# Patient Record
Sex: MALE | Race: WHITE | NOT HISPANIC OR LATINO | Employment: FULL TIME | URBAN - METROPOLITAN AREA
[De-identification: names, ages, dates, MRNs, and addresses within clinical notes are randomized per-mention and may not be internally consistent; named-entity substitution may affect disease eponyms.]

---

## 2018-11-23 ENCOUNTER — HOSPITAL ENCOUNTER (EMERGENCY)
Facility: HOSPITAL | Age: 38
Discharge: HOME | End: 2018-11-24
Attending: EMERGENCY MEDICINE | Admitting: EMERGENCY MEDICINE
Payer: COMMERCIAL

## 2018-11-23 VITALS
WEIGHT: 199.52 LBS | TEMPERATURE: 97.7 F | OXYGEN SATURATION: 99 % | RESPIRATION RATE: 18 BRPM | DIASTOLIC BLOOD PRESSURE: 86 MMHG | BODY MASS INDEX: 30.24 KG/M2 | HEART RATE: 88 BPM | SYSTOLIC BLOOD PRESSURE: 147 MMHG | HEIGHT: 68 IN

## 2018-11-23 DIAGNOSIS — M54.2 NECK PAIN: ICD-10-CM

## 2018-11-23 DIAGNOSIS — R42 DIZZINESS: Primary | ICD-10-CM

## 2018-11-23 PROCEDURE — 99284 EMERGENCY DEPT VISIT MOD MDM: CPT

## 2018-11-24 PROCEDURE — 93005 ELECTROCARDIOGRAM TRACING: CPT

## 2018-11-24 RX ORDER — NAPROXEN 500 MG/1
500 TABLET ORAL 2 TIMES DAILY PRN
Qty: 14 TABLET | Refills: 0 | Status: SHIPPED | OUTPATIENT
Start: 2018-11-24 | End: 2019-03-31 | Stop reason: ALTCHOICE

## 2018-11-24 RX ORDER — MECLIZINE HYDROCHLORIDE 25 MG/1
25 TABLET ORAL ONCE
Status: COMPLETED | OUTPATIENT
Start: 2018-11-24 | End: 2018-11-24

## 2018-11-24 RX ORDER — NAPROXEN 250 MG/1
500 TABLET ORAL ONCE
Status: COMPLETED | OUTPATIENT
Start: 2018-11-24 | End: 2018-11-24

## 2018-11-24 RX ORDER — MECLIZINE HYDROCHLORIDE 25 MG/1
25 TABLET ORAL EVERY 8 HOURS SCHEDULED
Qty: 30 TABLET | Refills: 0 | Status: SHIPPED | OUTPATIENT
Start: 2018-11-24 | End: 2019-03-31 | Stop reason: ALTCHOICE

## 2018-11-24 RX ORDER — OXYMETAZOLINE HYDROCHLORIDE 0.05 G/100ML
2 SPRAY NASAL ONCE
Status: COMPLETED | OUTPATIENT
Start: 2018-11-24 | End: 2018-11-24

## 2018-11-24 RX ORDER — NAPROXEN 500 MG/1
500 TABLET ORAL ONCE
Qty: 1 TABLET | Refills: 0 | Status: SHIPPED | OUTPATIENT
Start: 2018-11-24 | End: 2018-11-24

## 2018-11-24 RX ORDER — MECLIZINE HYDROCHLORIDE 25 MG/1
25 TABLET ORAL EVERY 8 HOURS SCHEDULED
Qty: 30 TABLET | Refills: 0 | Status: SHIPPED | OUTPATIENT
Start: 2018-11-24 | End: 2018-11-24

## 2018-11-24 RX ADMIN — MECLIZINE HYDROCHLORIDE 25 MG: 25 TABLET ORAL at 01:14

## 2018-11-24 RX ADMIN — NAPROXEN 500 MG: 250 TABLET ORAL at 01:14

## 2018-11-24 RX ADMIN — OXYMETAZOLINE HYDROCHLORIDE 2 SPRAY: 5 SPRAY NASAL at 01:16

## 2018-11-24 NOTE — ED PROVIDER NOTES
History  Chief Complaint   Patient presents with    Dizziness     Pt c/o dizziness for a few days with movement  Today when he got up, he got dizzy and the dizziness has worsened since then  Pt also c/o nausea and shortness of breath  Pt c/o some chest pain and headache      HPI   59-year-old male without significant medical history presents emergency department with chief complaint of dizziness  Patient states that over the past few days he has had intermittent dizziness that seemed to be exacerbated by movement, improved with rest   Tonight after standing up dizziness returned and did not completely resolve  After dizziness began, patient also began to feel slightly short of breath, but dyspnea resolved without intervention  Patient also complains of left-sided posterior neck pain that began yesterday and has since improved  On ROS, patient complains of left-sided chest pain that has been intermittent for the past year and for which he has already been evaluated by Cardiology and GI, with thought etiology pain more anxiety than a medical issue  Patient states that chest pain is not any different recently than it typically is  He also complains of chronic nasal congestion, right ear fullness yesterday that has since improved, and mild nausea earlier today, which has resolved  He denies recent fevers, chills, vomiting, abdominal pain, or complaints other than stated above  None       History reviewed  No pertinent past medical history  Past Surgical History:   Procedure Laterality Date    CHOLECYSTECTOMY      WRIST SURGERY         History reviewed  No pertinent family history  I have reviewed and agree with the history as documented  Social History   Substance Use Topics    Smoking status: Current Every Day Smoker     Types: E-Cigarettes    Smokeless tobacco: Never Used    Alcohol use Yes      Comment: rarely        Review of Systems   Constitutional: Negative for chills and fever     HENT: Positive for congestion and ear pain  Respiratory: Positive for shortness of breath  Cardiovascular: Negative for chest pain  Gastrointestinal: Negative for abdominal pain, nausea and vomiting  Musculoskeletal: Negative for back pain  Skin: Negative for rash and wound  Allergic/Immunologic: Negative for immunocompromised state  Neurological: Positive for dizziness  Negative for headaches  Psychiatric/Behavioral: The patient is not nervous/anxious  All other systems reviewed and are negative  Physical Exam  Physical Exam   Constitutional: He is oriented to person, place, and time  He appears well-nourished  No distress  HENT:   Head: Normocephalic and atraumatic  Eyes: EOM are normal    Neck: Normal range of motion  Neck supple  Cardiovascular: Normal rate and regular rhythm  Pulmonary/Chest: Effort normal and breath sounds normal  No respiratory distress  Abdominal: Soft  He exhibits no distension  There is no tenderness  Musculoskeletal: Normal range of motion  Neurological: He is alert and oriented to person, place, and time  No cranial nerve deficit  Coordination normal    Skin: Skin is warm and dry  He is not diaphoretic  Psychiatric: He has a normal mood and affect  His behavior is normal    Nursing note and vitals reviewed        Vital Signs  ED Triage Vitals [11/23/18 2357]   Temperature Pulse Respirations Blood Pressure SpO2   97 7 °F (36 5 °C) 88 18 147/86 99 %      Temp Source Heart Rate Source Patient Position - Orthostatic VS BP Location FiO2 (%)   Oral Monitor Lying Right arm --      Pain Score       No Pain           Vitals:    11/23/18 2357   BP: 147/86   Pulse: 88   Patient Position - Orthostatic VS: Lying       Visual Acuity      ED Medications  Medications   oxymetazoline (AFRIN) 0 05 % nasal spray 2 spray (2 sprays Each Nare Given 11/24/18 0116)   meclizine (ANTIVERT) tablet 25 mg (25 mg Oral Given 11/24/18 0114)   naproxen (NAPROSYN) tablet 500 mg (500 mg Oral Given 11/24/18 0114)       Diagnostic Studies  Results Reviewed     None                 No orders to display              Procedures  Procedures   EKG: NSR @ 79 BPM, no significant ST/T wave abnormality, normal EKG  No prior for comparison  Phone Contacts  ED Phone Contact    ED Course                               MDM  Number of Diagnoses or Management Options  Dizziness:   Neck pain:   Diagnosis management comments: 45-year-old male with intermittent dizziness x 3 days  Normal neuro exam, also c/o recent ear fullness and nasal congestion  Likely peripheral   Will treat sx and reassess  Patient offered obs in ED vs discharge with return precautions; would like to go home and sleep  Understands return precautions  CritCare Time    Disposition  Final diagnoses:   Dizziness   Neck pain     Time reflects when diagnosis was documented in both MDM as applicable and the Disposition within this note     Time User Action Codes Description Comment    11/24/2018  1:03 AM Nadya Patiño Add [R42] Dizziness     11/24/2018  1:06 AM Dayneroxanne  Add [M54 2] Neck pain       ED Disposition     ED Disposition Condition Comment    Discharge  Earma Factor discharge to home/self care  Condition at discharge: Good        Follow-up Information     Follow up With Specialties Details Why [address], DO Family Medicine  As needed, If symptoms worsen [address] [place] [address]  [phone]            Discharge Medication List as of 11/24/2018  1:07 AM      START taking these medications    Details   meclizine (ANTIVERT) 25 mg tablet Take 1 tablet (25 mg total) by mouth every 8 (eight) hours, Starting Sat 11/24/2018, Print      naproxen (NAPROSYN) 500 mg tablet Take 1 tablet (500 mg total) by mouth once for 1 dose, Starting Sat 11/24/2018, Print           No discharge procedures on file      ED Provider  Electronically Signed by           Shania Hays MD  11/29/18 1240

## 2018-11-24 NOTE — DISCHARGE INSTRUCTIONS
Dizziness   WHAT YOU NEED TO KNOW:   Dizziness is a feeling of being off balance or unsteady  Common causes of dizziness are an inner ear fluid imbalance or a lack of oxygen in your blood  Dizziness may be acute (lasts 3 days or less) or chronic (lasts longer than 3 days)  You may have dizzy spells that last from seconds to a few hours  DISCHARGE INSTRUCTIONS:   Return to the emergency department if:   · You have a headache and a stiff neck  · You have shaking chills and a fever  · You vomit over and over with no relief  · Your vomit or bowel movements are red or black  · You have pain in your chest, back, or abdomen  · You have numbness, especially in your face, arms, or legs  · You have trouble moving your arms or legs  · You are confused  Contact your healthcare provider if:   · You have a fever  · Your symptoms do not get better with treatment  · You have questions or concerns about your condition or care  Manage your symptoms:   · Do not drive  or operate heavy machinery when you are dizzy  · Get up slowly  from sitting or lying down  · Drink plenty of liquids  Liquids help prevent dehydration  Ask how much liquid to drink each day and which liquids are best for you  Follow up with your healthcare provider as directed:  Write down your questions so you remember to ask them during your visits  © 2017 [address]  Information is for End User's use only and may not be sold, redistributed or otherwise used for commercial purposes  All illustrations and images included in CareNotes® are the copyrighted property of A D A M , Inc  or Pedro Sawyer  The above information is an  only  It is not intended as medical advice for individual conditions or treatments  Talk to your doctor, nurse or pharmacist before following any medical regimen to see if it is safe and effective for you

## 2018-11-26 LAB
ATRIAL RATE: 79 BPM
P AXIS: 49 DEGREES
PR INTERVAL: 136 MS
QRS AXIS: 70 DEGREES
QRSD INTERVAL: 84 MS
QT INTERVAL: 354 MS
QTC INTERVAL: 405 MS
T WAVE AXIS: 43 DEGREES
VENTRICULAR RATE: 79 BPM

## 2018-11-26 PROCEDURE — 93010 ELECTROCARDIOGRAM REPORT: CPT | Performed by: INTERNAL MEDICINE

## 2019-03-29 ENCOUNTER — HOSPITAL ENCOUNTER (EMERGENCY)
Facility: HOSPITAL | Age: 39
Discharge: HOME | End: 2019-03-30
Attending: EMERGENCY MEDICINE
Payer: COMMERCIAL

## 2019-03-29 ENCOUNTER — APPOINTMENT (EMERGENCY)
Dept: CT IMAGING | Facility: HOSPITAL | Age: 39
End: 2019-03-29
Payer: COMMERCIAL

## 2019-03-29 VITALS
HEIGHT: 68 IN | RESPIRATION RATE: 16 BRPM | WEIGHT: 194 LBS | SYSTOLIC BLOOD PRESSURE: 110 MMHG | DIASTOLIC BLOOD PRESSURE: 62 MMHG | BODY MASS INDEX: 29.4 KG/M2 | TEMPERATURE: 98 F | OXYGEN SATURATION: 95 % | HEART RATE: 67 BPM

## 2019-03-29 DIAGNOSIS — R51.9 HEADACHE: ICD-10-CM

## 2019-03-29 DIAGNOSIS — R07.89 CHEST WALL PAIN: ICD-10-CM

## 2019-03-29 DIAGNOSIS — M54.9 BACK PAIN: Primary | ICD-10-CM
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
## 2019-03-29 LAB
ANION GAP SERPL CALCULATED.3IONS-SCNC: 9 MMOL/L (ref 4–13)
BASOPHILS # BLD AUTO: 0.04 THOUSANDS/ΜL (ref 0–0.1)
BASOPHILS NFR BLD AUTO: 1 % (ref 0–1)
BILIRUB UR QL STRIP: NEGATIVE
BUN SERPL-MCNC: 25 MG/DL (ref 5–25)
CALCIUM SERPL-MCNC: 8.9 MG/DL (ref 8.3–10.1)
CHLORIDE SERPL-SCNC: 106 MMOL/L (ref 100–108)
CLARITY UR: CLEAR
CO2 SERPL-SCNC: 27 MMOL/L (ref 21–32)
COLOR UR: YELLOW
CREAT SERPL-MCNC: 1.27 MG/DL (ref 0.6–1.3)
EOSINOPHIL # BLD AUTO: 0.15 THOUSAND/ΜL (ref 0–0.61)
EOSINOPHIL NFR BLD AUTO: 2 % (ref 0–6)
ERYTHROCYTE [DISTWIDTH] IN BLOOD BY AUTOMATED COUNT: 13.1 % (ref 11.6–15.1)
GFR SERPL CREATININE-BSD FRML MDRD: 71 ML/MIN/1.73SQ M
GLUCOSE SERPL-MCNC: 96 MG/DL (ref 65–140)
GLUCOSE UR STRIP-MCNC: NEGATIVE MG/DL
HCT VFR BLD AUTO: 41.2 % (ref 36.5–49.3)
HGB BLD-MCNC: 14.1 G/DL (ref 12–17)
HGB UR QL STRIP.AUTO: NEGATIVE
IMM GRANULOCYTES # BLD AUTO: 0.01 THOUSAND/UL (ref 0–0.2)
IMM GRANULOCYTES NFR BLD AUTO: 0 % (ref 0–2)
KETONES UR STRIP-MCNC: NEGATIVE MG/DL
LEUKOCYTE ESTERASE UR QL STRIP: NEGATIVE
LYMPHOCYTES # BLD AUTO: 2.88 THOUSANDS/ΜL (ref 0.6–4.47)
LYMPHOCYTES NFR BLD AUTO: 41 % (ref 14–44)
MCH RBC QN AUTO: 29.4 PG (ref 26.8–34.3)
MCHC RBC AUTO-ENTMCNC: 34.2 G/DL (ref 31.4–37.4)
MCV RBC AUTO: 86 FL (ref 82–98)
MONOCYTES # BLD AUTO: 0.51 THOUSAND/ΜL (ref 0.17–1.22)
MONOCYTES NFR BLD AUTO: 7 % (ref 4–12)
NEUTROPHILS # BLD AUTO: 3.37 THOUSANDS/ΜL (ref 1.85–7.62)
NEUTS SEG NFR BLD AUTO: 49 % (ref 43–75)
NITRITE UR QL STRIP: NEGATIVE
NRBC BLD AUTO-RTO: 0 /100 WBCS
PH UR STRIP.AUTO: 6 [PH]
PLATELET # BLD AUTO: 163 THOUSANDS/UL (ref 149–390)
PMV BLD AUTO: 9.5 FL (ref 8.9–12.7)
POTASSIUM SERPL-SCNC: 3.6 MMOL/L (ref 3.5–5.3)
PROT UR STRIP-MCNC: NEGATIVE MG/DL
RBC # BLD AUTO: 4.79 MILLION/UL (ref 3.88–5.62)
SODIUM SERPL-SCNC: 142 MMOL/L (ref 136–145)
SP GR UR STRIP.AUTO: <=1.005 (ref 1–1.03)
UROBILINOGEN UR QL STRIP.AUTO: 0.2 E.U./DL
WBC # BLD AUTO: 6.96 THOUSAND/UL (ref 4.31–10.16)

## 2019-03-29 PROCEDURE — 99284 EMERGENCY DEPT VISIT MOD MDM: CPT

## 2019-03-29 PROCEDURE — 72125 CT NECK SPINE W/O DYE: CPT

## 2019-03-29 PROCEDURE — 96361 HYDRATE IV INFUSION ADD-ON: CPT

## 2019-03-29 PROCEDURE — 71260 CT THORAX DX C+: CPT

## 2019-03-29 PROCEDURE — 70450 CT HEAD/BRAIN W/O DYE: CPT

## 2019-03-29 PROCEDURE — 36415 COLL VENOUS BLD VENIPUNCTURE: CPT | Performed by: EMERGENCY MEDICINE

## 2019-03-29 PROCEDURE — 81003 URINALYSIS AUTO W/O SCOPE: CPT

## 2019-03-29 PROCEDURE — 74177 CT ABD & PELVIS W/CONTRAST: CPT

## 2019-03-29 PROCEDURE — 80048 BASIC METABOLIC PNL TOTAL CA: CPT | Performed by: EMERGENCY MEDICINE

## 2019-03-29 PROCEDURE — 85025 COMPLETE CBC W/AUTO DIFF WBC: CPT | Performed by: EMERGENCY MEDICINE

## 2019-03-29 RX ORDER — MORPHINE SULFATE 4 MG/ML
4 INJECTION, SOLUTION INTRAMUSCULAR; INTRAVENOUS ONCE
Status: DISCONTINUED | OUTPATIENT
Start: 2019-03-29 | End: 2019-03-30 | Stop reason: HOSPADM

## 2019-03-29 RX ADMIN — SODIUM CHLORIDE 1000 ML: 0.9 INJECTION, SOLUTION INTRAVENOUS at 21:51

## 2019-03-29 RX ADMIN — IOHEXOL 100 ML: 350 INJECTION, SOLUTION INTRAVENOUS at 23:17

## 2019-03-30 PROCEDURE — 96374 THER/PROPH/DIAG INJ IV PUSH: CPT

## 2019-03-30 RX ORDER — NAPROXEN 500 MG/1
500 TABLET ORAL 2 TIMES DAILY WITH MEALS
Qty: 30 TABLET | Refills: 0 | Status: SHIPPED | OUTPATIENT
Start: 2019-03-30 | End: 2019-03-31 | Stop reason: ALTCHOICE

## 2019-03-30 RX ORDER — KETOROLAC TROMETHAMINE 30 MG/ML
15 INJECTION, SOLUTION INTRAMUSCULAR; INTRAVENOUS ONCE
Status: COMPLETED | OUTPATIENT
Start: 2019-03-30 | End: 2019-03-30

## 2019-03-30 RX ORDER — DIAZEPAM 5 MG/1
5 TABLET ORAL EVERY 8 HOURS PRN
Qty: 10 TABLET | Refills: 0 | Status: SHIPPED | OUTPATIENT
Start: 2019-03-30 | End: 2019-03-31 | Stop reason: ALTCHOICE

## 2019-03-30 RX ADMIN — KETOROLAC TROMETHAMINE 15 MG: 30 INJECTION, SOLUTION INTRAMUSCULAR; INTRAVENOUS at 00:14

## 2019-03-30 NOTE — DISCHARGE INSTRUCTIONS
Use Tylenol and naproxen for pain control during the day  Use Valium for pain control at night  Use the incentive spirometer to ensure full lung expansion  Use the incentive spirometer 10 times an hour every hour while awake

## 2019-03-30 NOTE — ED PROVIDER NOTES
History  Chief Complaint   Patient presents with    Back Pain     Pt reports he was snow boarding 1+ week ago and fell on his back  Pt reports "I felt a pop in my back and neck " PT back pain, neck pain, rib pain  43-year-old male presents after a snowboarding accident that occurred 1 week ago  He states that a skier cut in front of him which caused him to crash  Patient was not wearing a helmet  He states that he hit his back and neck on the ground  Did not lose consciousness  Does not believe he hit his head  Since then he has been having worsening headache, neck pain, back pain, chest tightness, abdominal pain  Attempted Tylenol which assisted the headache but did not assist any of the other pain  No blood thinners  Mild worsening since  Patient does not endorse loss of bladder control, no minerva rectal numbness, no numbness in his extremities, no difficulty walking, no off-balance feeling  Prior to Admission Medications   Prescriptions Last Dose Informant Patient Reported? Taking?   meclizine (ANTIVERT) 25 mg tablet   No No   Sig: Take 1 tablet (25 mg total) by mouth every 8 (eight) hours   naproxen (NAPROSYN) 500 mg tablet   No No   Sig: Take 1 tablet (500 mg total) by mouth 2 (two) times a day as needed for mild pain      Facility-Administered Medications: None       History reviewed  No pertinent past medical history  Past Surgical History:   Procedure Laterality Date    CHOLECYSTECTOMY      WRIST SURGERY         History reviewed  No pertinent family history  I have reviewed and agree with the history as documented  Social History     Tobacco Use    Smoking status: Current Every Day Smoker     Types: E-Cigarettes    Smokeless tobacco: Never Used   Substance Use Topics    Alcohol use: Yes     Comment: rarely    Drug use: No        Review of Systems   Constitutional: Negative for chills, fatigue and fever  HENT: Negative for congestion, rhinorrhea and sore throat      Eyes: Negative for visual disturbance  Respiratory: Positive for chest tightness and shortness of breath  Negative for cough  Cardiovascular: Negative for chest pain and leg swelling  Gastrointestinal: Positive for abdominal pain and nausea  Negative for vomiting  Genitourinary: Negative for dysuria and flank pain  Musculoskeletal: Positive for back pain and neck pain  Skin: Negative for rash and wound  Neurological: Positive for headaches  Negative for dizziness, weakness, light-headedness and numbness  Psychiatric/Behavioral: Negative for confusion  Physical Exam  Physical Exam   Constitutional: He is oriented to person, place, and time  He appears well-developed and well-nourished  No distress  HENT:   Head: Normocephalic and atraumatic  Right Ear: External ear normal    Left Ear: External ear normal    Mouth/Throat: Oropharynx is clear and moist    No hematotympanum   Eyes: Pupils are equal, round, and reactive to light  Conjunctivae and EOM are normal  Right eye exhibits no discharge  Left eye exhibits no discharge  No scleral icterus  Neck: Normal range of motion  Neck supple  No JVD present  No tracheal deviation present  Midline cervical tenderness, especially to C7   Cardiovascular: Normal rate, regular rhythm, normal heart sounds and intact distal pulses  Exam reveals no gallop and no friction rub  No murmur heard  Pulmonary/Chest: Effort normal and breath sounds normal  No stridor  No respiratory distress  He has no wheezes  He has no rales  He exhibits tenderness  CTA-BL   Abdominal: Soft  Bowel sounds are normal  He exhibits no distension  There is tenderness (Upper abdomen)  There is no rebound and no guarding  Stable pelvis   Musculoskeletal: Normal range of motion  He exhibits tenderness  He exhibits no edema or deformity  Back is tender to C7 midline, lumbar spine, no step offs  Neurological: He is alert and oriented to person, place, and time   No cranial nerve deficit or sensory deficit  GCS = 15  Neurovascularly intact distal to the injury  Skin: Skin is warm and dry  No rash noted  He is not diaphoretic  No erythema  No pallor  No wounds, abrasions, no ecchymosis  Psychiatric: He has a normal mood and affect  His behavior is normal    Vitals reviewed        Vital Signs  ED Triage Vitals [03/29/19 2017]   Temperature Pulse Respirations Blood Pressure SpO2   98 °F (36 7 °C) 69 18 125/75 97 %      Temp Source Heart Rate Source Patient Position - Orthostatic VS BP Location FiO2 (%)   Oral Monitor Sitting Left arm --      Pain Score       8           Vitals:    03/29/19 2017 03/29/19 2153   BP: 125/75 110/62   Pulse: 69 67   Patient Position - Orthostatic VS: Sitting Lying         Visual Acuity      ED Medications  Medications   morphine (PF) 4 mg/mL injection 4 mg (4 mg Intravenous Not Given 3/29/19 2153)   sodium chloride 0 9 % bolus 1,000 mL (0 mL Intravenous Stopped 3/29/19 2329)   iohexol (OMNIPAQUE) 350 MG/ML injection (MULTI-DOSE) 100 mL (100 mL Intravenous Given 3/29/19 2317)   ketorolac (TORADOL) injection 15 mg (15 mg Intravenous Given 3/30/19 0014)       Diagnostic Studies  Results Reviewed     Procedure Component Value Units Date/Time    UA w Reflex to Microscopic w Reflex to Culture [[id number]] Collected:  03/29/19 2330    Lab Status:  Final result Specimen:  Urine, Clean Catch Updated:  03/29/19 2335     Color, UA Yellow     Clarity, UA Clear     Specific Gravity, UA <=1 005     pH, UA 6 0     Leukocytes, UA Negative     Nitrite, UA Negative     Protein, UA Negative mg/dl      Glucose, UA Negative mg/dl      Ketones, UA Negative mg/dl      Urobilinogen, UA 0 2 E U /dl      Bilirubin, UA Negative     Blood, UA Negative    Basic metabolic panel [[id number]] Collected:  03/29/19 2150    Lab Status:  Final result Specimen:  Blood from Arm, Right Updated:  03/29/19 2209     Sodium 142 mmol/L      Potassium 3 6 mmol/L      Chloride 106 mmol/L      CO2 27 mmol/L ANION GAP 9 mmol/L      BUN 25 mg/dL      Creatinine 1 27 mg/dL      Glucose 96 mg/dL      Calcium 8 9 mg/dL      eGFR 71 ml/min/1 73sq m     Narrative:       National Kidney Disease Education Program recommendations are as follows:  GFR calculation is accurate only with a steady state creatinine  Chronic Kidney disease less than 60 ml/min/1 73 sq  meters  Kidney failure less than 15 ml/min/1 73 sq  meters  CBC and differential [[id number]] Collected:  03/29/19 2150    Lab Status:  Final result Specimen:  Blood from Arm, Right Updated:  03/29/19 2204     WBC 6 96 Thousand/uL      RBC 4 79 Million/uL      Hemoglobin 14 1 g/dL      Hematocrit 41 2 %      MCV 86 fL      MCH 29 4 pg      MCHC 34 2 g/dL      RDW 13 1 %      MPV 9 5 fL      Platelets 120 Thousands/uL      nRBC 0 /100 WBCs      Neutrophils Relative 49 %      Immat GRANS % 0 %      Lymphocytes Relative 41 %      Monocytes Relative 7 %      Eosinophils Relative 2 %      Basophils Relative 1 %      Neutrophils Absolute 3 37 Thousands/µL      Immature Grans Absolute 0 01 Thousand/uL      Lymphocytes Absolute 2 88 Thousands/µL      Monocytes Absolute 0 51 Thousand/µL      Eosinophils Absolute 0 15 Thousand/µL      Basophils Absolute 0 04 Thousands/µL                  CT head without contrast   ED Interpretation by Elena Garsia DO (03/29 2358)   No acute intracranial abnormality  Final Result by Magda Joy DO (03/29 2323)      No acute intracranial abnormality  Workstation performed: [id number]         CT spine cervical without contrast   ED Interpretation by Elena Garsia DO (03/29 2358)   No cervical spine fracture or traumatic malalignment  Final Result by Demetri Velazco MD (03/29 2331)      No cervical spine fracture or traumatic malalignment                     Workstation performed: [id number]         CT recon only thoracolumbar   ED Interpretation by Elena Garsia DO (03/29 2357)   No fracture or traumatic subluxation  Final Result by Magda Joy DO (03/29 2351)      No fracture or traumatic subluxation  Workstation performed: [id number]         CT chest abdomen pelvis w contrast   ED Interpretation by Elena Garsia DO (03/29 2358)   [address] right-sided hydroureteronephrosis without evidence of obstructing stone  Findings may be related to markedly distended urinary bladder  Clinical correlation is recommended  Follow-up is recommended  Final Result by Magda Joy DO (03/29 2345)      Mild right-sided hydroureteronephrosis without evidence of obstructing stone  Findings may be related to markedly distended urinary bladder  Clinical correlation is recommended  Follow-up is recommended  Workstation performed: [id number]                    Procedures  Procedures       Phone Contacts  ED Phone Contact    ED Course                               MDM  Number of Diagnoses or Management Options  Back pain:   Chest wall pain:   Headache:   Snowboarding accident:   Diagnosis management comments: 71-year-old status post snowboarding incident  Presenting with pain to head, neck, entire spine, chest, abdomen pelvis  Patient attempted Tylenol which improved his headache however did not help his other symptoms  CT scans are negative  Likely musculoskeletal pain after the accident  Patient is neurovascularly intact  He is discharged home with symptomatic treatment, good return precautions in case of worsening condition or any signs of neurovascular compromise  Jael Malik        Amount and/or Complexity of Data Reviewed  Clinical lab tests: reviewed and ordered  Tests in the radiology section of CPT®: ordered and reviewed        Disposition  Final diagnoses:   Back pain   Headache   Snowboarding accident   Chest wall pain     Time reflects when diagnosis was documented in both MDM as applicable and the Disposition within this note     Time User Action Codes Description Comment 3/29/2019 10:09 PM Rob Alysha BOOTH Add [M54 9] Back pain     3/29/2019 10:09 PM Alysha Rivas Add [R51] Headache     3/29/2019 10:09 PM Savita Rivas Add [J11 360I] Snowboarding accident     3/30/2019 12:09 AM Savita Rivas Add [R07 89] Chest wall pain       ED Disposition     ED Disposition Condition Date/Time Comment    Discharge Stable Sat Mar 30, 2019 12:09 AM Pat Alter discharge to home/self care  Follow-up Information    None         Patient's Medications   Discharge Prescriptions    DIAZEPAM (VALIUM) 5 MG TABLET    Take 1 tablet (5 mg total) by mouth every 8 (eight) hours as needed for sleep or muscle spasms for up to 5 days       Start Date: 3/30/2019 End Date: 4/4/2019       Order Dose: 5 mg       Quantity: 10 tablet    Refills: 0    NAPROXEN (NAPROSYN) 500 MG TABLET    Take 1 tablet (500 mg total) by mouth 2 (two) times a day with meals As needed for pain control       Start Date: 3/30/2019 End Date: --       Order Dose: 500 mg       Quantity: 30 tablet    Refills: 0     No discharge procedures on file      ED Provider  Electronically Signed by           Noy Sarah DO  03/30/19 7146

## 2019-03-31 ENCOUNTER — HOSPITAL ENCOUNTER (EMERGENCY)
Facility: HOSPITAL | Age: 39
Discharge: HOME | End: 2019-03-31
Attending: EMERGENCY MEDICINE | Admitting: EMERGENCY MEDICINE
Payer: OTHER MISCELLANEOUS

## 2019-03-31 ENCOUNTER — APPOINTMENT (EMERGENCY)
Dept: RADIOLOGY | Facility: HOSPITAL | Age: 39
End: 2019-03-31
Payer: OTHER MISCELLANEOUS

## 2019-03-31 VITALS
HEIGHT: 68 IN | DIASTOLIC BLOOD PRESSURE: 60 MMHG | WEIGHT: 195 LBS | BODY MASS INDEX: 29.55 KG/M2 | RESPIRATION RATE: 18 BRPM | TEMPERATURE: 98.3 F | HEART RATE: 69 BPM | SYSTOLIC BLOOD PRESSURE: 123 MMHG | OXYGEN SATURATION: 96 %

## 2019-03-31 DIAGNOSIS — S52.009A CLOSED FRACTURE OF PROXIMAL END OF ULNA WITHOUT ADDITIONAL FRACTURE: Primary | ICD-10-CM

## 2019-03-31 PROCEDURE — 73080 X-RAY EXAM OF ELBOW: CPT

## 2019-03-31 PROCEDURE — 73090 X-RAY EXAM OF FOREARM: CPT

## 2019-03-31 PROCEDURE — 99283 EMERGENCY DEPT VISIT LOW MDM: CPT

## 2019-04-01 ENCOUNTER — APPOINTMENT (OUTPATIENT)
Dept: OCCUPATIONAL MEDICINE | Facility: CLINIC | Age: 39
End: 2019-04-01
Payer: OTHER MISCELLANEOUS

## 2019-04-01 ENCOUNTER — TRANSCRIBE ORDERS (OUTPATIENT)
Dept: OCCUPATIONAL MEDICINE | Facility: CLINIC | Age: 39
End: 2019-04-01

## 2019-04-01 ENCOUNTER — APPOINTMENT (OUTPATIENT)
Dept: RADIOLOGY | Facility: CLINIC | Age: 39
End: 2019-04-01
Payer: OTHER MISCELLANEOUS

## 2019-04-01 DIAGNOSIS — R52 PAIN: Primary | ICD-10-CM

## 2019-04-01 DIAGNOSIS — R52 PAIN: ICD-10-CM

## 2019-04-01 PROCEDURE — 73110 X-RAY EXAM OF WRIST: CPT

## 2019-04-01 PROCEDURE — 99213 OFFICE O/P EST LOW 20 MIN: CPT

## 2019-04-03 ENCOUNTER — APPOINTMENT (OUTPATIENT)
Dept: OCCUPATIONAL MEDICINE | Facility: CLINIC | Age: 39
End: 2019-04-03
Payer: OTHER MISCELLANEOUS

## 2019-04-03 PROCEDURE — 99213 OFFICE O/P EST LOW 20 MIN: CPT

## 2020-12-23 ENCOUNTER — APPOINTMENT (OUTPATIENT)
Dept: RADIOLOGY | Facility: CLINIC | Age: 40
End: 2020-12-23
Payer: OTHER MISCELLANEOUS

## 2020-12-23 ENCOUNTER — OCCMED (OUTPATIENT)
Dept: URGENT CARE | Facility: CLINIC | Age: 40
End: 2020-12-23
Payer: OTHER MISCELLANEOUS

## 2020-12-23 DIAGNOSIS — S00.83XA CONTUSION OF FOREHEAD, INITIAL ENCOUNTER: ICD-10-CM

## 2020-12-23 DIAGNOSIS — S00.83XA CONTUSION OF FOREHEAD, INITIAL ENCOUNTER: Primary | ICD-10-CM

## 2020-12-23 PROCEDURE — 99284 EMERGENCY DEPT VISIT MOD MDM: CPT | Performed by: FAMILY MEDICINE

## 2020-12-23 PROCEDURE — 70250 X-RAY EXAM OF SKULL: CPT

## 2020-12-23 PROCEDURE — G0383 LEV 4 HOSP TYPE B ED VISIT: HCPCS | Performed by: FAMILY MEDICINE

## 2020-12-26 ENCOUNTER — APPOINTMENT (OUTPATIENT)
Dept: URGENT CARE | Facility: CLINIC | Age: 40
End: 2020-12-26
Payer: OTHER MISCELLANEOUS

## 2020-12-26 PROCEDURE — 99214 OFFICE O/P EST MOD 30 MIN: CPT

## 2021-01-08 ENCOUNTER — APPOINTMENT (OUTPATIENT)
Dept: URGENT CARE | Facility: CLINIC | Age: 41
End: 2021-01-08
Payer: OTHER MISCELLANEOUS

## 2021-01-08 PROCEDURE — 99213 OFFICE O/P EST LOW 20 MIN: CPT

## 2021-04-05 ENCOUNTER — OFFICE VISIT (OUTPATIENT)
Dept: URGENT CARE | Facility: CLINIC | Age: 41
End: 2021-04-05

## 2021-04-05 VITALS — WEIGHT: 200 LBS | HEIGHT: 68 IN | BODY MASS INDEX: 30.31 KG/M2

## 2021-04-05 DIAGNOSIS — K52.9 ACUTE GASTROENTERITIS: Primary | ICD-10-CM

## 2021-04-05 PROCEDURE — G0382 LEV 3 HOSP TYPE B ED VISIT: HCPCS | Performed by: NURSE PRACTITIONER

## 2021-04-05 NOTE — PATIENT INSTRUCTIONS
This seems to be in acute gastroenteritis  Clear liquid diet today  This may include Jell-O, broth, Gatorade and water  Advance to bland foods tomorrow  Avoid dairy, caffeine, spicy and citrus seafood as may make stomach irritation worse  Did offer COVID-19 testing  Do not suspect COVID-19 but recommend you continue to monitor for symptoms of the COVID as discussed if he develops any symptoms any should be tested as you declined testing here today  Go to the ER with any worsening symptoms, increased abdominal pain, persistent vomiting, fevers, no improvement in symptoms  You can return to work on 04/07/2021 as long as symptoms are improving  Gastroenteritis   WHAT YOU NEED TO KNOW:   Gastroenteritis, or stomach flu, is an infection of the stomach and intestines  DISCHARGE INSTRUCTIONS:   Call 911 for any of the following:   · You have trouble breathing or a very fast pulse  Return to the emergency department if:   · You see blood in your diarrhea  · You cannot stop vomiting  · You have not urinated for 12 hours  · You feel like you are going to faint  Contact your healthcare provider if:   · You have a fever  · You continue to vomit or have diarrhea, even after treatment  · You see worms in your diarrhea  · Your mouth or eyes are dry  You are not urinating as much or as often  · You have questions or concerns about your condition or care  Medicines:   · Medicines  may be given to stop vomiting or diarrhea, decrease abdominal cramps, or treat an infection  · Take your medicine as directed  Contact your healthcare provider if you think your medicine is not helping or if you have side effects  Tell him or her if you are allergic to any medicine  Keep a list of the medicines, vitamins, and herbs you take  Include the amounts, and when and why you take them  Bring the list or the pill bottles to follow-up visits   Carry your medicine list with you in case of an emergency  Manage your symptoms:   · Drink liquids as directed  Ask your healthcare provider how much liquid to drink each day, and which liquids are best for you  You may also need to drink an oral rehydration solution (ORS)  An ORS has the right amounts of sugar, salt, and minerals in water to replace body fluids  · Eat bland foods  When you feel hungry, begin eating soft, bland foods  Examples are bananas, clear soup, potatoes, and applesauce  Do not have dairy products, alcohol, sugary drinks, or drinks with caffeine until you feel better  · Rest as much as possible  Slowly start to do more each day when you begin to feel better  Prevent the spread of gastroenteritis:  Gastroenteritis can spread easily  Keep yourself, your family, and your surroundings clean to help prevent the spread of gastroenteritis:  · Wash your hands often  Use soap and water  Wash your hands after you use the bathroom, change a child's diapers, or sneeze  Wash your hands before you prepare or eat food  · Clean surfaces and do laundry often  Wash your clothes and towels separately from the rest of the laundry  Clean surfaces in your home with antibacterial  or bleach  · Clean food thoroughly and cook safely  Wash raw vegetables before you cook  Cook meat, fish, and eggs fully  Do not use the same dishes for raw meat as you do for other foods  Refrigerate any leftover food immediately  · Be aware when you camp or travel  Drink only clean water  Do not drink from rivers or lakes unless you purify or boil the water first  When you travel, drink bottled water and do not add ice  Do not eat fruit that has not been peeled  Do not eat raw fish or meat that is not fully cooked  Follow up with your healthcare provider as directed:  Write down your questions so you remember to ask them during your visits     © Copyright [address] Information is for End User's use only and may not be sold, redistributed or otherwise used for commercial purposes  All illustrations and images included in CareNotes® are the copyrighted property of A D A M , Inc  or Ministerio Hernandez  The above information is an  only  It is not intended as medical advice for individual conditions or treatments  Talk to your doctor, nurse or pharmacist before following any medical regimen to see if it is safe and effective for you

## 2021-04-05 NOTE — PROGRESS NOTES
[facility]        NAME: Omega Webster is a 36 y o  male  : 1980    MRN: [id number]  DATE: 2021  TIME: 2:36 PM    Assessment and Plan   Acute gastroenteritis [K52 9]  1  Acute gastroenteritis           Patient Instructions     Patient Instructions       This seems to be in acute gastroenteritis  Clear liquid diet today  This may include Jell-O, broth, Gatorade and water  Advance to bland foods tomorrow  Avoid dairy, caffeine, spicy and citrus seafood as may make stomach irritation worse  Did offer COVID-19 testing  Do not suspect COVID-19 but recommend you continue to monitor for symptoms of the COVID as discussed if he develops any symptoms any should be tested as you declined testing here today  Go to the ER with any worsening symptoms, increased abdominal pain, persistent vomiting, fevers, no improvement in symptoms  You can return to work on 2021 as long as symptoms are improving  Gastroenteritis   WHAT YOU NEED TO KNOW:   Gastroenteritis, or stomach flu, is an infection of the stomach and intestines  DISCHARGE INSTRUCTIONS:   Call 911 for any of the following:   · You have trouble breathing or a very fast pulse  Return to the emergency department if:   · You see blood in your diarrhea  · You cannot stop vomiting  · You have not urinated for 12 hours  · You feel like you are going to faint  Contact your healthcare provider if:   · You have a fever  · You continue to vomit or have diarrhea, even after treatment  · You see worms in your diarrhea  · Your mouth or eyes are dry  You are not urinating as much or as often  · You have questions or concerns about your condition or care  Medicines:   · Medicines  may be given to stop vomiting or diarrhea, decrease abdominal cramps, or treat an infection  · Take your medicine as directed    Contact your healthcare provider if you think your medicine is not helping or if you have side effects  Tell him or her if you are allergic to any medicine  Keep a list of the medicines, vitamins, and herbs you take  Include the amounts, and when and why you take them  Bring the list or the pill bottles to follow-up visits  Carry your medicine list with you in case of an emergency  Manage your symptoms:   · Drink liquids as directed  Ask your healthcare provider how much liquid to drink each day, and which liquids are best for you  You may also need to drink an oral rehydration solution (ORS)  An ORS has the right amounts of sugar, salt, and minerals in water to replace body fluids  · Eat bland foods  When you feel hungry, begin eating soft, bland foods  Examples are bananas, clear soup, potatoes, and applesauce  Do not have dairy products, alcohol, sugary drinks, or drinks with caffeine until you feel better  · Rest as much as possible  Slowly start to do more each day when you begin to feel better  Prevent the spread of gastroenteritis:  Gastroenteritis can spread easily  Keep yourself, your family, and your surroundings clean to help prevent the spread of gastroenteritis:  · Wash your hands often  Use soap and water  Wash your hands after you use the bathroom, change a child's diapers, or sneeze  Wash your hands before you prepare or eat food  · Clean surfaces and do laundry often  Wash your clothes and towels separately from the rest of the laundry  Clean surfaces in your home with antibacterial  or bleach  · Clean food thoroughly and cook safely  Wash raw vegetables before you cook  Cook meat, fish, and eggs fully  Do not use the same dishes for raw meat as you do for other foods  Refrigerate any leftover food immediately  · Be aware when you camp or travel  Drink only clean water  Do not drink from rivers or lakes unless you purify or boil the water first  When you travel, drink bottled water and do not add ice  Do not eat fruit that has not been peeled   Do not eat raw fish or meat that is not fully cooked  Follow up with your healthcare provider as directed:  Write down your questions so you remember to ask them during your visits  © Copyright [address] Information is for End User's use only and may not be sold, redistributed or otherwise used for commercial purposes  All illustrations and images included in CareNotes® are the copyrighted property of [employer]  or [facility] Cesar Renetta   The above information is an  only  It is not intended as medical advice for individual conditions or treatments  Talk to your doctor, nurse or pharmacist before following any medical regimen to see if it is safe and effective for you  Chief Complaint     Chief Complaint   Patient presents with    Diarrhea     nausea started this am just not feeling well very hard time with stools not aware of anyone with covid     Fatigue     does state been a little tired          History of Present Illness   Jaron Schultz presents to the clinic c/o     This is a 66-year-old male here today with complaints of diarrhea, nausea and upset stomach  He states symptoms started this morning  He has had multiple episode of diarrhea today  He denies any chest pain or shortness of breath  He does have some baseline chest discomfort with anxiety  He states there is no change in this  He denies any vomiting  He states he did eat steak last night that was in his Fridge possibly longer than it should have been  No fevers bodies or chills at this time  No cough or congestion  No loss of taste or smell  He states he did have COVID in the beginning of December 2020  Review of Systems   Review of Systems   Constitutional: Negative for activity change, chills, fatigue and fever  Respiratory: Negative  Cardiovascular: Negative  Gastrointestinal: Positive for diarrhea and nausea  Negative for abdominal pain and vomiting  Skin: Negative  Psychiatric/Behavioral: Negative  Current Medications     No long-term medications on file  Current Allergies     Allergies as of 04/05/2021 - Reviewed 04/05/2021   Allergen Reaction Noted    Hydrocodone Anaphylaxis 11/23/2018            The following portions of the patient's history were reviewed and updated as appropriate: allergies, current medications, past family history, past medical history, past social history, past surgical history and problem list     Objective   Ht 5' 8" (1 727 m)   Wt 90 7 kg (200 lb)   BMI 30 41 kg/m²        Physical Exam     Physical Exam  Vitals signs and nursing note reviewed  Constitutional:       Appearance: Normal appearance  HENT:      Right Ear: Tympanic membrane normal       Left Ear: Tympanic membrane normal    Cardiovascular:      Rate and Rhythm: Normal rate and regular rhythm  Pulses: Normal pulses  Heart sounds: Normal heart sounds  Pulmonary:      Effort: Pulmonary effort is normal       Breath sounds: Normal breath sounds  Abdominal:      Tenderness: There is abdominal tenderness ( slight discomfort in epigastric region)  There is no right CVA tenderness, left CVA tenderness or guarding  Neurological:      Mental Status: He is alert and oriented to person, place, and time  Psychiatric:         Mood and Affect: Mood normal          Behavior: Behavior normal          Thought Content:  Thought content normal          Judgment: Judgment normal

## 2021-04-05 NOTE — LETTER
April 5, 2021     Patient: Alida Moore   YOB: 1980   Date of Visit: 4/5/2021       To Whom It May Concern: It is my medical opinion that Alida Marrow may return to work on 04/07/2021 as long as symptoms are improving       If you have any questions or concerns, please don't hesitate to call           Sincerely,        MONI Hodges    CC: No Recipients

## 2021-04-14 ENCOUNTER — APPOINTMENT (EMERGENCY)
Dept: CT IMAGING | Facility: HOSPITAL | Age: 41
End: 2021-04-14

## 2021-04-14 ENCOUNTER — HOSPITAL ENCOUNTER (EMERGENCY)
Facility: HOSPITAL | Age: 41
Discharge: HOME | End: 2021-04-14
Attending: INTERNAL MEDICINE

## 2021-04-14 ENCOUNTER — OFFICE VISIT (OUTPATIENT)
Dept: URGENT CARE | Facility: CLINIC | Age: 41
End: 2021-04-14

## 2021-04-14 VITALS
HEIGHT: 68 IN | RESPIRATION RATE: 18 BRPM | OXYGEN SATURATION: 98 % | DIASTOLIC BLOOD PRESSURE: 80 MMHG | WEIGHT: 200 LBS | HEART RATE: 91 BPM | BODY MASS INDEX: 30.31 KG/M2 | SYSTOLIC BLOOD PRESSURE: 123 MMHG | TEMPERATURE: 97.9 F

## 2021-04-14 VITALS
TEMPERATURE: 97 F | HEART RATE: 66 BPM | SYSTOLIC BLOOD PRESSURE: 118 MMHG | WEIGHT: 200 LBS | OXYGEN SATURATION: 96 % | RESPIRATION RATE: 16 BRPM | DIASTOLIC BLOOD PRESSURE: 76 MMHG | BODY MASS INDEX: 30.31 KG/M2 | HEIGHT: 68 IN

## 2021-04-14 DIAGNOSIS — R10.31 RIGHT LOWER QUADRANT ABDOMINAL PAIN: Primary | ICD-10-CM

## 2021-04-14 DIAGNOSIS — R10.9 RIGHT FLANK PAIN: ICD-10-CM

## 2021-04-14 DIAGNOSIS — R10.9 FLANK PAIN: Primary | ICD-10-CM

## 2021-04-14 LAB
ALBUMIN SERPL BCP-MCNC: 4.6 G/DL (ref 3.5–5.7)
ALP SERPL-CCNC: 53 U/L (ref 40–150)
ALT SERPL W P-5'-P-CCNC: 20 U/L (ref 7–52)
ANION GAP SERPL CALCULATED.3IONS-SCNC: 10 MMOL/L (ref 4–13)
AST SERPL W P-5'-P-CCNC: 14 U/L (ref 13–39)
BASOPHILS # BLD AUTO: 0 THOUSANDS/ΜL (ref 0–0.1)
BASOPHILS NFR BLD AUTO: 0 % (ref 0–2)
BILIRUB SERPL-MCNC: 0.4 MG/DL (ref 0.2–1)
BILIRUB UR QL STRIP: NEGATIVE
BUN SERPL-MCNC: 21 MG/DL (ref 7–25)
CALCIUM SERPL-MCNC: 9.6 MG/DL (ref 8.6–10.5)
CHLORIDE SERPL-SCNC: 103 MMOL/L (ref 98–107)
CLARITY UR: CLEAR
CO2 SERPL-SCNC: 29 MMOL/L (ref 21–31)
COLOR UR: YELLOW
CREAT SERPL-MCNC: 1.16 MG/DL (ref 0.7–1.3)
EOSINOPHIL # BLD AUTO: 0 THOUSAND/ΜL (ref 0–0.61)
EOSINOPHIL NFR BLD AUTO: 1 % (ref 0–5)
ERYTHROCYTE [DISTWIDTH] IN BLOOD BY AUTOMATED COUNT: 13.9 % (ref 11.5–14.5)
GFR SERPL CREATININE-BSD FRML MDRD: 78 ML/MIN/1.73SQ M
GLUCOSE SERPL-MCNC: 100 MG/DL (ref 65–99)
GLUCOSE UR STRIP-MCNC: NEGATIVE MG/DL
HCT VFR BLD AUTO: 45 % (ref 42–47)
HGB BLD-MCNC: 15.4 G/DL (ref 14–18)
HGB UR QL STRIP.AUTO: NEGATIVE
KETONES UR STRIP-MCNC: NEGATIVE MG/DL
LEUKOCYTE ESTERASE UR QL STRIP: NEGATIVE
LYMPHOCYTES # BLD AUTO: 2.3 THOUSANDS/ΜL (ref 0.6–4.47)
LYMPHOCYTES NFR BLD AUTO: 34 % (ref 21–51)
MCH RBC QN AUTO: 29.4 PG (ref 26–34)
MCHC RBC AUTO-ENTMCNC: 34.3 G/DL (ref 31–37)
MCV RBC AUTO: 86 FL (ref 81–99)
MONOCYTES # BLD AUTO: 0.4 THOUSAND/ΜL (ref 0.17–1.22)
MONOCYTES NFR BLD AUTO: 6 % (ref 2–12)
NEUTROPHILS # BLD AUTO: 3.9 THOUSANDS/ΜL (ref 1.4–6.5)
NEUTS SEG NFR BLD AUTO: 59 % (ref 42–75)
NITRITE UR QL STRIP: NEGATIVE
PH UR STRIP.AUTO: 6.5 [PH]
PLATELET # BLD AUTO: 162 THOUSANDS/UL (ref 149–390)
PMV BLD AUTO: 7.6 FL (ref 8.6–11.7)
POTASSIUM SERPL-SCNC: 4.2 MMOL/L (ref 3.5–5.5)
PROT SERPL-MCNC: 7.2 G/DL (ref 6.4–8.9)
PROT UR STRIP-MCNC: NEGATIVE MG/DL
RBC # BLD AUTO: 5.25 MILLION/UL (ref 4.3–5.9)
SODIUM SERPL-SCNC: 142 MMOL/L (ref 134–143)
SP GR UR STRIP.AUTO: 1.01 (ref 1–1.03)
UROBILINOGEN UR QL STRIP.AUTO: 0.2 E.U./DL
WBC # BLD AUTO: 6.7 THOUSAND/UL (ref 4.8–10.8)

## 2021-04-14 PROCEDURE — G1004 CDSM NDSC: HCPCS

## 2021-04-14 PROCEDURE — 99284 EMERGENCY DEPT VISIT MOD MDM: CPT

## 2021-04-14 PROCEDURE — 81003 URINALYSIS AUTO W/O SCOPE: CPT | Performed by: INTERNAL MEDICINE

## 2021-04-14 PROCEDURE — 85025 COMPLETE CBC W/AUTO DIFF WBC: CPT | Performed by: INTERNAL MEDICINE

## 2021-04-14 PROCEDURE — 99284 EMERGENCY DEPT VISIT MOD MDM: CPT | Performed by: INTERNAL MEDICINE

## 2021-04-14 PROCEDURE — 80053 COMPREHEN METABOLIC PANEL: CPT | Performed by: INTERNAL MEDICINE

## 2021-04-14 PROCEDURE — G0383 LEV 4 HOSP TYPE B ED VISIT: HCPCS | Performed by: NURSE PRACTITIONER

## 2021-04-14 PROCEDURE — 74176 CT ABD & PELVIS W/O CONTRAST: CPT

## 2021-04-14 PROCEDURE — 36415 COLL VENOUS BLD VENIPUNCTURE: CPT | Performed by: INTERNAL MEDICINE

## 2021-04-14 RX ORDER — ONDANSETRON 2 MG/ML
4 INJECTION INTRAMUSCULAR; INTRAVENOUS ONCE
Status: DISCONTINUED | OUTPATIENT
Start: 2021-04-14 | End: 2021-04-14

## 2021-04-14 RX ORDER — KETOROLAC TROMETHAMINE 30 MG/ML
30 INJECTION, SOLUTION INTRAMUSCULAR; INTRAVENOUS ONCE
Status: DISCONTINUED | OUTPATIENT
Start: 2021-04-14 | End: 2021-04-14

## 2021-04-14 RX ORDER — METHOCARBAMOL 500 MG/1
500 TABLET, FILM COATED ORAL 2 TIMES DAILY PRN
Qty: 20 TABLET | Refills: 0 | Status: SHIPPED | OUTPATIENT
Start: 2021-04-14 | End: 2021-04-24

## 2021-04-14 NOTE — DISCHARGE INSTRUCTIONS
Heating pad and rest  Robaxin twice a day as needed for muscle spasm or discomfort  Return if you develop fever, or escalation of symptoms

## 2021-04-14 NOTE — ED PROVIDER NOTES
History  Chief Complaint   Patient presents with    Flank Pain     right side     69-year-old male with no prior history of kidney stones or kidney disease presents with right flank pain  Symptoms present for 2-3 days prior to arriving  Colicky in nature, with some nausea associated with increasing pain  Today it got bad enough for he sought medical attention  Denies any trauma  Denies straining himself  He is status post cholecystectomy  None       Past Medical History:   Diagnosis Date    Allergic     Hypoglycemia        Past Surgical History:   Procedure Laterality Date    CHOLECYSTECTOMY      WRIST SURGERY         Family History   Problem Relation Age of Onset    Heart disease Mother     Diabetes Mother     Kidney disease Mother     Heart disease Father     Diabetes Father     Kidney disease Father      I have reviewed and agree with the history as documented  E-Cigarette/Vaping     E-Cigarette/Vaping Substances     Social History     Tobacco Use    Smoking status: Current Every Day Smoker     Types: E-Cigarettes    Smokeless tobacco: Never Used   Substance Use Topics    Alcohol use: Yes     Frequency: Monthly or less     Comment: rarely    Drug use: No       Review of Systems   Constitutional: Negative for chills and fever  HENT: Negative for rhinorrhea and sore throat  Eyes: Negative for visual disturbance  Respiratory: Negative for cough and shortness of breath  Cardiovascular: Negative for chest pain and leg swelling  Gastrointestinal: Negative for abdominal pain, diarrhea, nausea and vomiting  Genitourinary: Positive for flank pain  Negative for dysuria  Musculoskeletal: Negative for back pain and myalgias  Right flank pain, radiating to right lower abdominal area   Skin: Negative for rash  Neurological: Negative for dizziness and headaches  Psychiatric/Behavioral: Negative for confusion  All other systems reviewed and are negative        Physical Exam  Physical Exam  Vitals signs and nursing note reviewed  Constitutional:       Appearance: He is well-developed  HENT:      Nose: Nose normal       Mouth/Throat:      Pharynx: No oropharyngeal exudate  Eyes:      General: No scleral icterus  Conjunctiva/sclera: Conjunctivae normal       Pupils: Pupils are equal, round, and reactive to light  Neck:      Musculoskeletal: Normal range of motion and neck supple  Vascular: No JVD  Trachea: No tracheal deviation  Cardiovascular:      Rate and Rhythm: Normal rate and regular rhythm  Heart sounds: Normal heart sounds  No murmur  Pulmonary:      Effort: Pulmonary effort is normal  No respiratory distress  Breath sounds: Normal breath sounds  No wheezing or rales  Abdominal:      General: Bowel sounds are normal       Palpations: Abdomen is soft  Tenderness: There is no abdominal tenderness  There is right CVA tenderness  There is no guarding  Comments: Pain to the right lower quadrant, no rebound however   Musculoskeletal: Normal range of motion  General: No tenderness  Skin:     General: Skin is warm and dry  Neurological:      Mental Status: He is alert and oriented to person, place, and time  Cranial Nerves: No cranial nerve deficit  Sensory: No sensory deficit  Motor: No abnormal muscle tone        Comments: 5/5 motor, nl sens   Psychiatric:         Behavior: Behavior normal          Vital Signs  ED Triage Vitals [04/14/21 1355]   Temperature Pulse Respirations Blood Pressure SpO2   (!) 97 °F (36 1 °C) 66 16 118/76 96 %      Temp Source Heart Rate Source Patient Position - Orthostatic VS BP Location FiO2 (%)   Temporal Monitor Sitting Left arm --      Pain Score       No Pain           Vitals:    04/14/21 1355   BP: 118/76   Pulse: 66   Patient Position - Orthostatic VS: Sitting         Visual Acuity      ED Medications  Medications - No data to display    Diagnostic Studies  Results Reviewed Procedure Component Value Units Date/Time    Comprehensive metabolic panel [[id number]]  (Abnormal) Collected: 04/14/21 1421    Lab Status: Final result Specimen: Blood from Arm, Left Updated: 04/14/21 1457     Sodium 142 mmol/L      Potassium 4 2 mmol/L      Chloride 103 mmol/L      CO2 29 mmol/L      ANION GAP 10 mmol/L      BUN 21 mg/dL      Creatinine 1 16 mg/dL      Glucose 100 mg/dL      Calcium 9 6 mg/dL      AST 14 U/L      ALT 20 U/L      Alkaline Phosphatase 53 U/L      Total Protein 7 2 g/dL      Albumin 4 6 g/dL      Total Bilirubin 0 40 mg/dL      eGFR 78 ml/min/1 73sq m     Narrative:      National Kidney Disease Foundation guidelines for Chronic Kidney Disease (CKD):     Stage 1 with normal or high GFR (GFR > 90 mL/min/1 73 square meters)    Stage 2 Mild CKD (GFR = 60-89 mL/min/1 73 square meters)    Stage 3A Moderate CKD (GFR = 45-59 mL/min/1 73 square meters)    Stage 3B Moderate CKD (GFR = 30-44 mL/min/1 73 square meters)    Stage 4 Severe CKD (GFR = 15-29 mL/min/1 73 square meters)    Stage 5 End Stage CKD (GFR <15 mL/min/1 73 square meters)  Note: GFR calculation is accurate only with a steady state creatinine    UA w Reflex to Microscopic w Reflex to Culture [[id number]]  (Normal) Collected: 04/14/21 1421    Lab Status: Final result Specimen: Urine, Clean Catch Updated: 04/14/21 1437     Color, UA Yellow     Clarity, UA Clear     Specific Gravity, UA 1 010     pH, UA 6 5     Leukocytes, UA Negative     Nitrite, UA Negative     Protein, UA Negative mg/dl      Glucose, UA Negative mg/dl      Ketones, UA Negative mg/dl      Urobilinogen, UA 0 2 E U /dl      Bilirubin, UA Negative     Blood, UA Negative    CBC and differential [[id number]]  (Abnormal) Collected: 04/14/21 1421    Lab Status: Final result Specimen: Blood from Arm, Left Updated: 04/14/21 1435     WBC 6 70 Thousand/uL      RBC 5 25 Million/uL      Hemoglobin 15 4 g/dL      Hematocrit 45 0 %      MCV 86 fL      MCH 29 4 pg MCHC 34 3 g/dL      RDW 13 9 %      MPV 7 6 fL      Platelets 837 Thousands/uL      Neutrophils Relative 59 %      Lymphocytes Relative 34 %      Monocytes Relative 6 %      Eosinophils Relative 1 %      Basophils Relative 0 %      Neutrophils Absolute 3 90 Thousands/µL      Lymphocytes Absolute 2 30 Thousands/µL      Monocytes Absolute 0 40 Thousand/µL      Eosinophils Absolute 0 00 Thousand/µL      Basophils Absolute 0 00 Thousands/µL                  CT renal stone study abdomen pelvis without contrast   Final Result by Mali Doran MD (04/14 1553)      No evidence of nephroureterolithiasis or obstructive uropathy  No acute inflammatory process within the abdomen or pelvis  Workstation performed: [id number]                    Procedures  Procedures         ED Course  ED Course as of Apr 14 1917 Wed Apr 14, 2021   1602 No evidence of kidney stone on CT scan  Normal testicular exam in recheck at this moment  Will treat is a muscle strain for now, 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  Possibly this is the source  He will return if symptoms change or escalate      1606 (Notably patient ct was delayed due to a stroke alert)      1607 TOTAL BILIRUBIN: 0 40                                           MDM    Disposition  Final diagnoses:   Flank pain     Time reflects when diagnosis was documented in both MDM as applicable and the Disposition within this note     Time User Action Codes Description Comment    4/14/2021  4:03 PM Randell Juarez Add [R10 9] Flank pain       ED Disposition     ED Disposition Condition Date/Time Comment    Discharge Stable Wed Apr 14, 2021  4:03 PM Karma  discharge to home/self care  Follow-up Information     Follow up With Specialties Details Why 1656 Candi Shepard MD Family Medicine  As needed if symptoms persist [address] [facility]    [address]  [address] [facility] Discharge Medication List as of 4/14/2021  4:06 PM      START taking these medications    Details   methocarbamol (ROBAXIN) 500 mg tablet Take 1 tablet (500 mg total) by mouth 2 (two) times a day as needed for muscle spasms for up to 10 days, Starting Wed 4/14/2021, Until Sat 4/24/2021, Normal           No discharge procedures on file      PDMP Review     None          ED Provider  Electronically Signed by           Nelson Vazquez DO  04/14/21 6409

## 2021-04-14 NOTE — PROGRESS NOTES
[facility]        NAME: Deniz Taylor is a 36 y o  male  : 1980    MRN: [id number]  DATE: 2021  TIME: 2:29 PM    Assessment and Plan   Right lower quadrant abdominal pain [R10 31]  1  Right lower quadrant abdominal pain  Transfer to other facility   2  Right flank pain  Transfer to other facility         Patient Instructions     Patient Instructions     As we discussed I would recommend to go to the ER for further evaluation  You were agreeable  You go to [facility]   Patient presents with    Abdominal Pain     Right side pain starts in front wraps around to back  started 2 days ago  History of Present Illness   Deniz Taylor presents to the clinic c/o      This is a 25-year-old male here today with complaints of right lower abdominal pain and right lower back pain  He states pain started in his abdomen  Pain then radiates to his back  He is primarily having right-sided flank pain at this time  He denies any fevers bodies or chills  He has had some nausea but no vomiting or diarrhea  No urinary symptoms including urgency frequency or burning  No history of kidney stones  But states he has a family history of kidney stones  Review of Systems   Review of Systems   Constitutional: Negative for activity change, chills, fatigue and fever  Respiratory: Negative  Cardiovascular: Negative  Gastrointestinal: Positive for abdominal pain and nausea  Negative for diarrhea and vomiting  Skin: Negative  Neurological: Negative  Psychiatric/Behavioral: Negative  Current Medications     No long-term medications on file         Current Allergies     Allergies as of 2021 - Reviewed 2021   Allergen Reaction Noted    Hydrocodone Anaphylaxis 2018            The following portions of the patient's history were reviewed and updated as appropriate: allergies, current medications, past family history, past medical history, past social history, past surgical history and problem list     Objective   /80   Pulse 91   Temp 97 9 °F (36 6 °C)   Resp 18   Ht 5' 8" (1 727 m)   Wt 90 7 kg (200 lb)   SpO2 98%   BMI 30 41 kg/m²        Physical Exam     Physical Exam  Nursing note reviewed  Constitutional:       Appearance: He is well-developed  Pulmonary:      Effort: Pulmonary effort is normal       Breath sounds: Normal breath sounds  Abdominal:      General: Abdomen is flat  Bowel sounds are normal  There is no distension  Tenderness: There is abdominal tenderness in the right lower quadrant  There is no right CVA tenderness, left CVA tenderness or guarding  Negative signs include Nicole's sign and McBurney's sign  Neurological:      General: No focal deficit present  Mental Status: He is alert and oriented to person, place, and time     Psychiatric:         Mood and Affect: Mood normal          Behavior: Behavior normal

## 2021-04-14 NOTE — PATIENT INSTRUCTIONS
As we discussed I would recommend to go to the ER for further evaluation  You were agreeable    You go to [facility]

## 2021-08-07 ENCOUNTER — APPOINTMENT (EMERGENCY)
Dept: RADIOLOGY | Facility: HOSPITAL | Age: 41
End: 2021-08-07

## 2021-08-07 ENCOUNTER — HOSPITAL ENCOUNTER (EMERGENCY)
Facility: HOSPITAL | Age: 41
Discharge: HOME | End: 2021-08-07
Attending: INTERNAL MEDICINE | Admitting: INTERNAL MEDICINE

## 2021-08-07 ENCOUNTER — OFFICE VISIT (OUTPATIENT)
Dept: URGENT CARE | Facility: CLINIC | Age: 41
End: 2021-08-07

## 2021-08-07 VITALS
WEIGHT: 210 LBS | BODY MASS INDEX: 31.83 KG/M2 | HEIGHT: 68 IN | SYSTOLIC BLOOD PRESSURE: 127 MMHG | DIASTOLIC BLOOD PRESSURE: 83 MMHG | RESPIRATION RATE: 18 BRPM | TEMPERATURE: 97 F | HEART RATE: 78 BPM | OXYGEN SATURATION: 98 %

## 2021-08-07 VITALS
DIASTOLIC BLOOD PRESSURE: 70 MMHG | HEART RATE: 60 BPM | OXYGEN SATURATION: 97 % | WEIGHT: 210 LBS | HEIGHT: 68 IN | BODY MASS INDEX: 31.83 KG/M2 | TEMPERATURE: 98 F | RESPIRATION RATE: 16 BRPM | SYSTOLIC BLOOD PRESSURE: 110 MMHG

## 2021-08-07 DIAGNOSIS — K21.9 GERD (GASTROESOPHAGEAL REFLUX DISEASE): ICD-10-CM

## 2021-08-07 DIAGNOSIS — R07.89 OTHER CHEST PAIN: Primary | ICD-10-CM

## 2021-08-07 DIAGNOSIS — R07.9 CHEST PAIN, UNSPECIFIED TYPE: Primary | ICD-10-CM

## 2021-08-07 DIAGNOSIS — R42 DIZZINESS: ICD-10-CM

## 2021-08-07 LAB
ALBUMIN SERPL BCP-MCNC: 4.6 G/DL (ref 3.5–5.7)
ALP SERPL-CCNC: 66 U/L (ref 40–150)
ALT SERPL W P-5'-P-CCNC: 19 U/L (ref 7–52)
ANION GAP SERPL CALCULATED.3IONS-SCNC: 7 MMOL/L (ref 4–13)
AST SERPL W P-5'-P-CCNC: 13 U/L (ref 13–39)
BASOPHILS # BLD AUTO: 0 THOUSANDS/ΜL (ref 0–0.1)
BASOPHILS NFR BLD AUTO: 0 % (ref 0–2)
BILIRUB SERPL-MCNC: 0.4 MG/DL (ref 0.2–1)
BUN SERPL-MCNC: 20 MG/DL (ref 7–25)
CALCIUM SERPL-MCNC: 9.3 MG/DL (ref 8.6–10.5)
CHLORIDE SERPL-SCNC: 107 MMOL/L (ref 98–107)
CO2 SERPL-SCNC: 27 MMOL/L (ref 21–31)
CREAT SERPL-MCNC: 1.3 MG/DL (ref 0.7–1.3)
EOSINOPHIL # BLD AUTO: 0 THOUSAND/ΜL (ref 0–0.61)
EOSINOPHIL NFR BLD AUTO: 0 % (ref 0–5)
ERYTHROCYTE [DISTWIDTH] IN BLOOD BY AUTOMATED COUNT: 13.3 % (ref 11.5–14.5)
GFR SERPL CREATININE-BSD FRML MDRD: 68 ML/MIN/1.73SQ M
GLUCOSE SERPL-MCNC: 103 MG/DL (ref 65–140)
GLUCOSE SERPL-MCNC: 103 MG/DL (ref 65–99)
HCT VFR BLD AUTO: 45.6 % (ref 42–47)
HGB BLD-MCNC: 15.6 G/DL (ref 14–18)
LYMPHOCYTES # BLD AUTO: 2 THOUSANDS/ΜL (ref 0.6–4.47)
LYMPHOCYTES NFR BLD AUTO: 30 % (ref 21–51)
MCH RBC QN AUTO: 29.1 PG (ref 26–34)
MCHC RBC AUTO-ENTMCNC: 34.1 G/DL (ref 31–37)
MCV RBC AUTO: 85 FL (ref 81–99)
MONOCYTES # BLD AUTO: 0.4 THOUSAND/ΜL (ref 0.17–1.22)
MONOCYTES NFR BLD AUTO: 7 % (ref 2–12)
NEUTROPHILS # BLD AUTO: 4 THOUSANDS/ΜL (ref 1.4–6.5)
NEUTS SEG NFR BLD AUTO: 62 % (ref 42–75)
PLATELET # BLD AUTO: 199 THOUSANDS/UL (ref 149–390)
PMV BLD AUTO: 7.5 FL (ref 8.6–11.7)
POTASSIUM SERPL-SCNC: 4.2 MMOL/L (ref 3.5–5.5)
PROT SERPL-MCNC: 7.4 G/DL (ref 6.4–8.9)
RBC # BLD AUTO: 5.35 MILLION/UL (ref 4.3–5.9)
SODIUM SERPL-SCNC: 141 MMOL/L (ref 134–143)
TROPONIN I SERPL-MCNC: <0.03 NG/ML
WBC # BLD AUTO: 6.5 THOUSAND/UL (ref 4.8–10.8)

## 2021-08-07 PROCEDURE — 99284 EMERGENCY DEPT VISIT MOD MDM: CPT | Performed by: INTERNAL MEDICINE

## 2021-08-07 PROCEDURE — G0384 LEV 5 HOSP TYPE B ED VISIT: HCPCS | Performed by: NURSE PRACTITIONER

## 2021-08-07 PROCEDURE — 71045 X-RAY EXAM CHEST 1 VIEW: CPT

## 2021-08-07 PROCEDURE — 99285 EMERGENCY DEPT VISIT HI MDM: CPT

## 2021-08-07 PROCEDURE — 82948 REAGENT STRIP/BLOOD GLUCOSE: CPT | Performed by: NURSE PRACTITIONER

## 2021-08-07 PROCEDURE — 36415 COLL VENOUS BLD VENIPUNCTURE: CPT | Performed by: INTERNAL MEDICINE

## 2021-08-07 PROCEDURE — 93005 ELECTROCARDIOGRAM TRACING: CPT

## 2021-08-07 PROCEDURE — 93005 ELECTROCARDIOGRAM TRACING: CPT | Performed by: NURSE PRACTITIONER

## 2021-08-07 PROCEDURE — 85025 COMPLETE CBC W/AUTO DIFF WBC: CPT | Performed by: INTERNAL MEDICINE

## 2021-08-07 PROCEDURE — 84484 ASSAY OF TROPONIN QUANT: CPT | Performed by: INTERNAL MEDICINE

## 2021-08-07 PROCEDURE — 80053 COMPREHEN METABOLIC PANEL: CPT | Performed by: INTERNAL MEDICINE

## 2021-08-07 RX ORDER — SODIUM CHLORIDE 9 MG/ML
3 INJECTION INTRAVENOUS
Status: DISCONTINUED | OUTPATIENT
Start: 2021-08-07 | End: 2021-08-07 | Stop reason: HOSPADM

## 2021-08-07 RX ORDER — OMEPRAZOLE 20 MG/1
20 CAPSULE, DELAYED RELEASE ORAL DAILY
Qty: 28 CAPSULE | Refills: 0 | Status: SHIPPED | OUTPATIENT
Start: 2021-08-07

## 2021-08-07 RX ORDER — ASPIRIN 81 MG/1
324 TABLET, CHEWABLE ORAL ONCE
Status: COMPLETED | OUTPATIENT
Start: 2021-08-07 | End: 2021-08-07

## 2021-08-07 RX ADMIN — ASPIRIN 324 MG: 81 TABLET, CHEWABLE ORAL at 10:15

## 2021-08-07 NOTE — PROGRESS NOTES
[facility]        NAME: Danya Hoyt is a 36 y o  male  : 1980    MRN: [id number]  DATE: 2021  TIME: 10:36 AM    Assessment and Plan   Other chest pain [R07 89]  1  Other chest pain  Transfer to other facility    aspirin chewable tablet 324 mg   2  Dizziness  Transfer to other facility    aspirin chewable tablet 324 mg         Patient Instructions       Follow up with PCP in 3-5 days  Proceed to  ER if symptoms worsen  Send to ED via EMS  Follow up with PCP after ED visit         Chief Complaint     Chief Complaint   Patient presents with    Dizziness     Patient c/o feeling dizzy, weak, and confused x 2 days  Patient does report chest pain after exerting himeself 2 days ago that subsided within five minutes  History of Present Illness       This is a 36year old male who states has an extensive family history of CAD, father had MI and  at 37, mother had MI before 48, brother with MI at 39  Pt states that he has had intermittent chest pain x 2 days  He states that it began while carrying a heavy coffee table  It was central chest pain that felt like indigestion that went through to his back that felt like squeezing  He states that the pain subsided a few minutes after  He states that he has not felt well since  He states that yesterday he was walking and talking to a neighbor and became sweating with left arm pain  He states he has had a headache and right jaw pain   He states he does vape, stopped smoking 4 yrs ago  Rare ETOH  States had covid in December  States has taken tylenol only for pain    Dizziness  Associated symptoms include chest pain, diaphoresis, fatigue and headaches  Review of Systems   Review of Systems   Constitutional: Positive for diaphoresis and fatigue  Eyes: Negative  Respiratory: Positive for shortness of breath  Cardiovascular: Positive for chest pain  Gastrointestinal: Negative      Endocrine: Negative  Genitourinary: Negative  Musculoskeletal: Positive for back pain  Skin: Negative  Allergic/Immunologic: Negative  Neurological: Positive for dizziness and headaches  Hematological: Negative  Psychiatric/Behavioral: Negative  Current Medications       Current Outpatient Medications:     methocarbamol (ROBAXIN) 500 mg tablet, Take 1 tablet (500 mg total) by mouth 2 (two) times a day as needed for muscle spasms for up to 10 days (Patient not taking: Reported on 8/7/2021), Disp: 20 tablet, Rfl: 0  No current facility-administered medications for this visit  Current Allergies     Allergies as of 08/07/2021 - Reviewed 08/07/2021   Allergen Reaction Noted    Hydrocodone Anaphylaxis 11/23/2018            The following portions of the patient's history were reviewed and updated as appropriate: allergies, current medications, past family history, past medical history, past social history, past surgical history and problem list      Past Medical History:   Diagnosis Date    Allergic     Hypoglycemia        Past Surgical History:   Procedure Laterality Date    CHOLECYSTECTOMY      WRIST SURGERY         Family History   Problem Relation Age of Onset    Heart disease Mother     Diabetes Mother     Kidney disease Mother     Heart disease Father     Diabetes Father     Kidney disease Father          Medications have been verified  Objective   /83   Pulse 78   Temp (!) 97 °F (36 1 °C) (Temporal)   Resp 18   Ht 5' 8" (1 727 m)   Wt 95 3 kg (210 lb)   SpO2 98%   BMI 31 93 kg/m²   No LMP for male patient  Physical Exam     Physical Exam  Vitals and nursing note reviewed  Constitutional:       General: He is not in acute distress  Appearance: Normal appearance  He is normal weight  He is diaphoretic  He is not ill-appearing or toxic-appearing  Comments: Appears not to feel well    HENT:      Head: Normocephalic and atraumatic     Eyes: Extraocular Movements: Extraocular movements intact  Cardiovascular:      Rate and Rhythm: Normal rate and regular rhythm  Pulses: Normal pulses  Heart sounds: Normal heart sounds  Pulmonary:      Effort: Pulmonary effort is normal       Breath sounds: Normal breath sounds  Abdominal:      Palpations: Abdomen is soft  Tenderness: There is no abdominal tenderness  Musculoskeletal:         General: Normal range of motion  Cervical back: Normal range of motion and neck supple  Skin:     General: Skin is warm  Capillary Refill: Capillary refill takes less than 2 seconds  Neurological:      General: No focal deficit present  Mental Status: He is alert and oriented to person, place, and time  GCS: GCS eye subscore is 4  GCS verbal subscore is 5  GCS motor subscore is 6  Cranial Nerves: Cranial nerves are intact  Sensory: Sensation is intact  Motor: Motor function is intact  Coordination: Coordination is intact  Gait: Gait is intact  Psychiatric:         Mood and Affect: Mood normal          Behavior: Behavior normal          Thought Content:  Thought content normal          Judgment: Judgment normal            EKG NSR no STEMI  Copy sent to ED

## 2021-08-07 NOTE — ED PROVIDER NOTES
History  No chief complaint on file  42-year-old male brought in by EMS with chief complaint of chest pain  Patient states the chest pain started about 3 4 days ago when he was moving furniture upstairs to an apartment states he had a carry had table by himself and was a heavy table  Patient noted chest tightness while doing this and then laid down after doing this and became diaphoretic  Patient denies any nausea vomiting, he was not short of breath at associated with the chest pain he felt the pain was midsternal describes as tightness heaviness radiating to his back  He denies previous history of the symptoms  He was concerned because he has a strong family history of coronary artery disease is father  in his 46s as that his mother from complications of coronary artery disease and coronary artery disease associated with diabetes  Patient does not see a physician because he states he has no insurance and can not afford to  Patient states the pain was non positional not associated with respiration  The patient had a previous history of GERD, he states when he was about [age]years old they told he had Magaña's esophagus but he had an endoscopy performed around 5 years ago and they told him there is no evidence of Magaña's at this time  He does state he gets a lot of reflux after meals  Patient is a former smoker quit smoking 4 years ago, he drinks alcohol rarely, he had a history of drug abuse in the past however he has not done any drugs he states for over 10 years  Patient denies any feelings of near-syncope true syncope  Cannot display prior to admission medications because the patient has not been admitted in this contact         Past Medical History:   Diagnosis Date    Allergic     Hypoglycemia        Past Surgical History:   Procedure Laterality Date    CHOLECYSTECTOMY      WRIST SURGERY         Family History   Problem Relation Age of Onset    Heart disease Mother    [facility] Diabetes Mother     Kidney disease Mother     Heart disease Father     Diabetes Father     Kidney disease Father      I have reviewed and agree with the history as documented  E-Cigarette/Vaping     E-Cigarette/Vaping Substances     Social History     Tobacco Use    Smoking status: Current Every Day Smoker     Types: E-Cigarettes    Smokeless tobacco: Never Used   Substance Use Topics    Alcohol use: Yes     Comment: rarely    Drug use: No       Review of Systems   Constitutional: Negative  HENT: Negative  Respiratory: Negative for shortness of breath  Cardiovascular: Positive for chest pain  Negative for palpitations and leg swelling  Gastrointestinal: Negative  Endocrine: Negative  Genitourinary: Negative  Musculoskeletal: Negative  Skin: Negative  Neurological: Negative  Psychiatric/Behavioral: The patient is nervous/anxious  Physical Exam  Physical Exam  Vitals and nursing note reviewed  Constitutional:       General: He is not in acute distress  Appearance: He is well-developed and normal weight  He is not ill-appearing, toxic-appearing or diaphoretic  HENT:      Head: Normocephalic and atraumatic  Cardiovascular:      Rate and Rhythm: Normal rate and regular rhythm  Heart sounds: Normal heart sounds  Pulmonary:      Effort: Pulmonary effort is normal       Breath sounds: Normal breath sounds  Chest:      Chest wall: No mass, deformity, tenderness, crepitus or edema  There is no dullness to percussion  Abdominal:      General: Bowel sounds are normal       Palpations: Abdomen is soft  Musculoskeletal:         General: Normal range of motion  Cervical back: Normal range of motion and neck supple  Right lower leg: No edema  Left lower leg: No edema  Skin:     General: Skin is warm and dry  Neurological:      General: No focal deficit present  Mental Status: He is alert  He is disoriented        Cranial Nerves: No cranial nerve deficit  Motor: No weakness  Vital Signs  ED Triage Vitals   Temp Pulse Resp BP SpO2   -- -- -- -- --      Temp src Heart Rate Source Patient Position - Orthostatic VS BP Location FiO2 (%)   -- -- -- -- --      Pain Score       --           There were no vitals filed for this visit  Visual Acuity      ED Medications  Medications - No data to display    Diagnostic Studies  Results Reviewed     None                 No orders to display              Procedures  Procedures         ED Course                                           MDM  Number of Diagnoses or Management Options  Diagnosis management comments: Patient with chest pain  This times had no chest pain since being in the emergency room  Workup is negative for coronary artery disease  Recommend follow-up with PCP will refer to [address] [place] [address] PCP and tolerated and      Disposition  Final diagnoses:   None     ED Disposition     None      Follow-up Information    None         Patient's Medications   Discharge Prescriptions    No medications on file     No discharge procedures on file      PDMP Review     None          ED Provider  Electronically Signed by           Sreekanth Golden MD  08/07/21 9831

## 2021-08-15 LAB
ATRIAL RATE: 63 BPM
ATRIAL RATE: 65 BPM
P AXIS: 38 DEGREES
P AXIS: 52 DEGREES
PR INTERVAL: 124 MS
PR INTERVAL: 134 MS
QRS AXIS: 61 DEGREES
QRS AXIS: 70 DEGREES
QRSD INTERVAL: 78 MS
QRSD INTERVAL: 82 MS
QT INTERVAL: 372 MS
QT INTERVAL: 382 MS
QTC INTERVAL: 386 MS
QTC INTERVAL: 390 MS
T WAVE AXIS: 12 DEGREES
T WAVE AXIS: 53 DEGREES
VENTRICULAR RATE: 63 BPM
VENTRICULAR RATE: 65 BPM

## 2021-08-15 PROCEDURE — 93010 ELECTROCARDIOGRAM REPORT: CPT | Performed by: INTERNAL MEDICINE

## 2021-09-07 ENCOUNTER — OFFICE VISIT (OUTPATIENT)
Dept: URGENT CARE | Facility: CLINIC | Age: 41
End: 2021-09-07

## 2021-09-07 VITALS — DIASTOLIC BLOOD PRESSURE: 92 MMHG | SYSTOLIC BLOOD PRESSURE: 150 MMHG | HEART RATE: 65 BPM | OXYGEN SATURATION: 97 %

## 2021-09-07 DIAGNOSIS — K08.89 PAIN, DENTAL: Primary | ICD-10-CM

## 2021-09-07 DIAGNOSIS — K02.9 DENTAL CARIES: ICD-10-CM

## 2021-09-07 RX ORDER — AMOXICILLIN 500 MG/1
500 CAPSULE ORAL EVERY 8 HOURS SCHEDULED
Qty: 21 CAPSULE | Refills: 0 | Status: SHIPPED | OUTPATIENT
Start: 2021-09-07 | End: 2021-09-14

## 2021-09-07 RX ORDER — IBUPROFEN 600 MG/1
600 TABLET ORAL EVERY 8 HOURS PRN
Qty: 30 TABLET | Refills: 0 | Status: SHIPPED | OUTPATIENT
Start: 2021-09-07

## 2021-09-07 NOTE — PROGRESS NOTES
[facility]        NAME: Shania Amin is a 36 y o  male  : 1980    MRN: [id number]  DATE: 2021  TIME: 7:24 PM    Assessment and Plan   Pain, dental [K08 89]  1  Pain, dental  amoxicillin (AMOXIL) 500 mg capsule    ibuprofen (MOTRIN) 600 mg tablet   2  Dental caries  amoxicillin (AMOXIL) 500 mg capsule    ibuprofen (MOTRIN) 600 mg tablet         Patient Instructions       Follow up with PCP in 3-5 days  Proceed to  ER if symptoms worsen  You have been prescribed motrin 600 mg for pain - Do NOT take any over the counter motrin (NSAIDS)  You may take tylenol every 4-6 hours as needed  You are to take ALL of the amoxicillin  You need to find a dentist tomorrow   Follow up with your PCP  Go to the ED if symptoms worsen            Chief Complaint     Chief Complaint   Patient presents with    Dental Pain         History of Present Illness       This is a 36year old male who states has had right upper wisdom tooth decay for a while and it is broken and today became painful  He took tylenol at 5pm  He states he does not have a dentist       Dental Pain         Review of Systems   Review of Systems   Constitutional: Negative  HENT: Positive for dental problem  Eyes: Negative  Respiratory: Negative  Cardiovascular: Negative  Gastrointestinal: Negative  Endocrine: Negative  Genitourinary: Negative  Musculoskeletal: Negative  Skin: Negative  Allergic/Immunologic: Negative  Neurological: Negative  Hematological: Negative  Psychiatric/Behavioral: Negative            Current Medications       Current Outpatient Medications:     amoxicillin (AMOXIL) 500 mg capsule, Take 1 capsule (500 mg total) by mouth every 8 (eight) hours for 7 days, Disp: 21 capsule, Rfl: 0    ibuprofen (MOTRIN) 600 mg tablet, Take 1 tablet (600 mg total) by mouth every 8 (eight) hours as needed for mild pain or moderate pain, Disp: 30 tablet, Rfl: 0    methocarbamol (ROBAXIN) 500 mg tablet, Take 1 tablet (500 mg total) by mouth 2 (two) times a day as needed for muscle spasms for up to 10 days (Patient not taking: Reported on 8/7/2021), Disp: 20 tablet, Rfl: 0    omeprazole (PriLOSEC) 20 mg delayed release capsule, Take 1 capsule (20 mg total) by mouth daily, Disp: 28 capsule, Rfl: 0    Current Allergies     Allergies as of 09/07/2021 - Reviewed 09/07/2021   Allergen Reaction Noted    Hydrocodone Anaphylaxis 11/23/2018            The following portions of the patient's history were reviewed and updated as appropriate: allergies, current medications, past family history, past medical history, past social history, past surgical history and problem list      Past Medical History:   Diagnosis Date    Allergic     Hypoglycemia        Past Surgical History:   Procedure Laterality Date    CHOLECYSTECTOMY      WRIST SURGERY         Family History   Problem Relation Age of Onset    Heart disease Mother     Diabetes Mother     Kidney disease Mother     Heart disease Father     Diabetes Father     Kidney disease Father          Medications have been verified  Objective   /92   Pulse 65   SpO2 97%   No LMP for male patient  Physical Exam     Physical Exam  Vitals and nursing note reviewed  Constitutional:       General: He is not in acute distress  Appearance: Normal appearance  He is obese  He is not ill-appearing, toxic-appearing or diaphoretic  Comments: Appears uncomfortable    HENT:      Head: Normocephalic and atraumatic  Nose: Nose normal       Mouth/Throat:      Mouth: Mucous membranes are moist      Eyes:      Extraocular Movements: Extraocular movements intact  Cardiovascular:      Rate and Rhythm: Normal rate  Pulmonary:      Effort: Pulmonary effort is normal    Musculoskeletal:         General: Normal range of motion  Cervical back: Normal range of motion  Skin:     General: Skin is warm and dry        Capillary Refill: Capillary refill takes less than 2 seconds  Neurological:      General: No focal deficit present  Mental Status: He is alert and oriented to person, place, and time  Psychiatric:         Mood and Affect: Mood normal          Behavior: Behavior normal          Thought Content:  Thought content normal          Judgment: Judgment normal

## 2021-09-07 NOTE — PATIENT INSTRUCTIONS
You have been prescribed motrin 600 mg for pain - Do NOT take any over the counter motrin (NSAIDS)  You may take tylenol every 4-6 hours as needed  You are to take ALL of the amoxicillin  You need to find a dentist tomorrow   Follow up with your PCP  Go to the ED if symptoms worsen    Dental Caries   WHAT YOU NEED TO KNOW:   Dental caries are also called cavities  Cavities are caused by bacteria  When the bacteria in tooth plaque (sticky film) mix with certain types of carbohydrate, this creates acid  The acid breaks down areas of enamel, which covers the outside of a tooth  This creates a small hole in the tooth called a cavity  DISCHARGE INSTRUCTIONS:   Return to the emergency department if:   · Your face, jaw, cheek, eye, or neck begin to swell  Contact your dentist if:   · You have a fever  · Your tooth pain gets worse  · You have questions or concerns about your condition or care  Follow up with your dentist as directed:  Write down your questions so you remember to ask them during your visits  Prevent dental caries:   · Brush your teeth at least 2 times a day with fluoride toothpaste  · Use dental floss to clean between your teeth at least once a day  · Rinse your mouth with water or mouthwash after meals and snacks  · Chew sugarless gum after meals and snacks  · See your dentist regularly for dental cleanings and oral exams  © 2017 [address] is for End User's use only and may not be sold, redistributed or otherwise used for commercial purposes  All illustrations and images included in CareNotes® are the copyrighted property of [employer]  or Pedro Sawyer  The above information is an  only  It is not intended as medical advice for individual conditions or treatments  Talk to your doctor, nurse or pharmacist before following any medical regimen to see if it is safe and effective for you    Toothache   WHAT YOU NEED TO KNOW: A toothache is pain that is caused by irritation of the nerves in the center of your tooth  The irritation may be caused by several problems, such as a cavity, an infection, a cracked tooth, or gum disease  DISCHARGE INSTRUCTIONS:   Return to the emergency department if:   · You have trouble breathing or swallowing  · You have swelling in your face or neck  Contact your dentist if:   · You have a fever and chills  · You have trouble opening or closing your mouth  · You have swelling around your tooth  · You have questions or concerns about your condition or care  Medicines: You may  need any of the following:  · NSAIDs , such as ibuprofen, help decrease swelling, pain, and fever  This medicine is available with or without a doctor's order  NSAIDs can cause stomach bleeding or kidney problems in certain people  If you take blood thinner medicine, always ask if NSAIDs are safe for you  Always read the medicine label and follow directions  Do not give these medicines to children under 10months of age without direction from your child's healthcare provider  · Acetaminophen  decreases pain and fever  It is available without a doctor's order  Ask how much to take and how often to take it  Follow directions  Acetaminophen can cause liver damage if not taken correctly  · Prescription pain medicine  may be given  Ask your healthcare provider how to take this medicine safely  Some prescription pain medicines contain acetaminophen  Do not take other medicines that contain acetaminophen without talking to your healthcare provider  Too much acetaminophen may cause liver damage  Prescription pain medicine may cause constipation  Ask your healthcare provider how to prevent or treat constipation  · Antibiotics  help treat or prevent a bacterial infection  · Take your medicine as directed    Contact your healthcare provider if you think your medicine is not helping or if you have side effects  Tell him of her if you are allergic to any medicine  Keep a list of the medicines, vitamins, and herbs you take  Include the amounts, and when and why you take them  Bring the list or the pill bottles to follow-up visits  Carry your medicine list with you in case of an emergency  Self-care:   · Rinse your mouth with warm salt water  4 times a day or as directed  · Eat soft foods  to help relieve pain caused by chewing  · Apply ice on your jaw or cheek  for 15 to 20 minutes every hour or as directed  Use an ice pack, or put crushed ice in a plastic bag  Cover it with a towel before you apply it  Ice helps prevent tissue damage and decreases swelling and pain  Help prevent a toothache:   · Brush your teeth at least 2 times a day  · Use dental floss to clean between your teeth at least 1 time a day  · See your dentist regularly every 6 months for dental cleanings and oral exams  Follow up with your dentist as directed: You may be referred to a dental surgeon  Write down your questions so you remember to ask them during your visits  © Copyright [employer] 2021 Information is for End User's use only and may not be sold, redistributed or otherwise used for commercial purposes  All illustrations and images included in CareNotes® are the copyrighted property of [employer]  or [facility] Jonny Hernandez  The above information is an  only  It is not intended as medical advice for individual conditions or treatments  Talk to your doctor, nurse or pharmacist before following any medical regimen to see if it is safe and effective for you

## 2021-09-09 ENCOUNTER — APPOINTMENT (EMERGENCY)
Dept: RADIOLOGY | Facility: HOSPITAL | Age: 41
End: 2021-09-09

## 2021-09-09 ENCOUNTER — HOSPITAL ENCOUNTER (EMERGENCY)
Facility: HOSPITAL | Age: 41
Discharge: HOME | End: 2021-09-10
Attending: EMERGENCY MEDICINE | Admitting: EMERGENCY MEDICINE

## 2021-09-09 ENCOUNTER — APPOINTMENT (EMERGENCY)
Dept: CT IMAGING | Facility: HOSPITAL | Age: 41
End: 2021-09-09

## 2021-09-09 VITALS
BODY MASS INDEX: 31.83 KG/M2 | SYSTOLIC BLOOD PRESSURE: 124 MMHG | RESPIRATION RATE: 16 BRPM | TEMPERATURE: 98.5 F | OXYGEN SATURATION: 100 % | DIASTOLIC BLOOD PRESSURE: 79 MMHG | WEIGHT: 210 LBS | HEIGHT: 68 IN | HEART RATE: 70 BPM

## 2021-09-09 DIAGNOSIS — R42 DIZZINESS: Primary | ICD-10-CM

## 2021-09-09 DIAGNOSIS — R06.02 SOB (SHORTNESS OF BREATH): ICD-10-CM

## 2021-09-09 DIAGNOSIS — K04.7 TOOTH INFECTION: ICD-10-CM

## 2021-09-09 DIAGNOSIS — R11.0 NAUSEA: ICD-10-CM

## 2021-09-09 LAB
ALBUMIN SERPL BCP-MCNC: 4.7 G/DL (ref 3.5–5.7)
ALP SERPL-CCNC: 66 U/L (ref 40–150)
ALT SERPL W P-5'-P-CCNC: 34 U/L (ref 7–52)
ANION GAP SERPL CALCULATED.3IONS-SCNC: 7 MMOL/L (ref 4–13)
AST SERPL W P-5'-P-CCNC: 17 U/L (ref 13–39)
ATRIAL RATE: 78 BPM
BASOPHILS # BLD AUTO: 0 THOUSANDS/ΜL (ref 0–0.1)
BASOPHILS NFR BLD AUTO: 0 % (ref 0–2)
BILIRUB SERPL-MCNC: 0.4 MG/DL (ref 0.2–1)
BUN SERPL-MCNC: 19 MG/DL (ref 7–25)
CALCIUM SERPL-MCNC: 10 MG/DL (ref 8.6–10.5)
CHLORIDE SERPL-SCNC: 106 MMOL/L (ref 98–107)
CO2 SERPL-SCNC: 29 MMOL/L (ref 21–31)
CREAT SERPL-MCNC: 1.07 MG/DL (ref 0.7–1.3)
EOSINOPHIL # BLD AUTO: 0 THOUSAND/ΜL (ref 0–0.61)
EOSINOPHIL NFR BLD AUTO: 1 % (ref 0–5)
ERYTHROCYTE [DISTWIDTH] IN BLOOD BY AUTOMATED COUNT: 13.2 % (ref 11.5–14.5)
GFR SERPL CREATININE-BSD FRML MDRD: 86 ML/MIN/1.73SQ M
GLUCOSE SERPL-MCNC: 138 MG/DL (ref 65–99)
HCT VFR BLD AUTO: 42.4 % (ref 42–47)
HGB BLD-MCNC: 14.8 G/DL (ref 14–18)
LYMPHOCYTES # BLD AUTO: 1.7 THOUSANDS/ΜL (ref 0.6–4.47)
LYMPHOCYTES NFR BLD AUTO: 24 % (ref 21–51)
MCH RBC QN AUTO: 29.6 PG (ref 26–34)
MCHC RBC AUTO-ENTMCNC: 34.8 G/DL (ref 31–37)
MCV RBC AUTO: 85 FL (ref 81–99)
MONOCYTES # BLD AUTO: 0.4 THOUSAND/ΜL (ref 0.17–1.22)
MONOCYTES NFR BLD AUTO: 6 % (ref 2–12)
NEUTROPHILS # BLD AUTO: 5 THOUSANDS/ΜL (ref 1.4–6.5)
NEUTS SEG NFR BLD AUTO: 70 % (ref 42–75)
P AXIS: 54 DEGREES
PLATELET # BLD AUTO: 179 THOUSANDS/UL (ref 149–390)
PMV BLD AUTO: 7.7 FL (ref 8.6–11.7)
POTASSIUM SERPL-SCNC: 3.8 MMOL/L (ref 3.5–5.5)
PR INTERVAL: 134 MS
PROT SERPL-MCNC: 7.3 G/DL (ref 6.4–8.9)
QRS AXIS: 82 DEGREES
QRSD INTERVAL: 80 MS
QT INTERVAL: 358 MS
QTC INTERVAL: 408 MS
RBC # BLD AUTO: 5 MILLION/UL (ref 4.3–5.9)
SODIUM SERPL-SCNC: 142 MMOL/L (ref 134–143)
T WAVE AXIS: 49 DEGREES
TROPONIN I SERPL-MCNC: <0.03 NG/ML
VENTRICULAR RATE: 78 BPM
WBC # BLD AUTO: 7.2 THOUSAND/UL (ref 4.8–10.8)

## 2021-09-09 PROCEDURE — 93005 ELECTROCARDIOGRAM TRACING: CPT

## 2021-09-09 PROCEDURE — 99285 EMERGENCY DEPT VISIT HI MDM: CPT

## 2021-09-09 PROCEDURE — 85025 COMPLETE CBC W/AUTO DIFF WBC: CPT | Performed by: EMERGENCY MEDICINE

## 2021-09-09 PROCEDURE — 99285 EMERGENCY DEPT VISIT HI MDM: CPT | Performed by: EMERGENCY MEDICINE

## 2021-09-09 PROCEDURE — 93010 ELECTROCARDIOGRAM REPORT: CPT | Performed by: INTERNAL MEDICINE

## 2021-09-09 PROCEDURE — G1004 CDSM NDSC: HCPCS

## 2021-09-09 PROCEDURE — 70450 CT HEAD/BRAIN W/O DYE: CPT

## 2021-09-09 PROCEDURE — 96360 HYDRATION IV INFUSION INIT: CPT

## 2021-09-09 PROCEDURE — 36415 COLL VENOUS BLD VENIPUNCTURE: CPT | Performed by: EMERGENCY MEDICINE

## 2021-09-09 PROCEDURE — 80053 COMPREHEN METABOLIC PANEL: CPT | Performed by: EMERGENCY MEDICINE

## 2021-09-09 PROCEDURE — 96361 HYDRATE IV INFUSION ADD-ON: CPT

## 2021-09-09 PROCEDURE — 84484 ASSAY OF TROPONIN QUANT: CPT | Performed by: EMERGENCY MEDICINE

## 2021-09-09 PROCEDURE — 71045 X-RAY EXAM CHEST 1 VIEW: CPT

## 2021-09-09 RX ORDER — CLINDAMYCIN HYDROCHLORIDE 150 MG/1
450 CAPSULE ORAL 3 TIMES DAILY
Qty: 63 CAPSULE | Refills: 0 | Status: SHIPPED | OUTPATIENT
Start: 2021-09-09 | End: 2021-09-16

## 2021-09-09 RX ADMIN — SODIUM CHLORIDE 1000 ML: 0.9 INJECTION, SOLUTION INTRAVENOUS at 21:54

## 2021-09-10 NOTE — ED PROVIDER NOTES
History  Chief Complaint   Patient presents with    Anxiety     thinks he's having a Rx to amoxicillin and motrin  never happened to him before  textbook vitals    Dizziness     Patient is a 51-year-old male presents for evaluation of dizziness  Patient says that it happened shortly after taking his dose of amoxicillin earlier this evening around 6:00 a m  And he is worried that he is having allergic reaction to it  G[place] He is on amoxicillin for a infected wisdom tooth    He says that he developed generalized weakness, dizziness which he describes somewhat as the room spinning  He also said that he felt nauseous but denies any vomiting  He admits to a mild intermittent headache as well since this incident that is not worse than headaches he has had before  He also admits to some shortness of breath which she says feels similar to shortness of breath he has had with anxiety but said he was not feeling anxious at the time or currently  He denies any tongue, lip, throat swelling, difficulty swallowing or breathing, rashes  He denies any chest pain, abdominal pain, fevers, chills, cough  Prior to Admission Medications   Prescriptions Last Dose Informant Patient Reported?  Taking?   amoxicillin (AMOXIL) 500 mg capsule   No No   Sig: Take 1 capsule (500 mg total) by mouth every 8 (eight) hours for 7 days   ibuprofen (MOTRIN) 600 mg tablet   No No   Sig: Take 1 tablet (600 mg total) by mouth every 8 (eight) hours as needed for mild pain or moderate pain   methocarbamol (ROBAXIN) 500 mg tablet   No No   Sig: Take 1 tablet (500 mg total) by mouth 2 (two) times a day as needed for muscle spasms for up to 10 days   Patient not taking: Reported on 8/7/2021   omeprazole (PriLOSEC) 20 mg delayed release capsule   No No   Sig: Take 1 capsule (20 mg total) by mouth daily      Facility-Administered Medications: None       Past Medical History:   Diagnosis Date    Allergic     Hypoglycemia        Past Surgical History:   Procedure Laterality Date    CHOLECYSTECTOMY      WRIST SURGERY         Family History   Problem Relation Age of Onset    Heart disease Mother     Diabetes Mother     Kidney disease Mother     Heart disease Father     Diabetes Father     Kidney disease Father      I have reviewed and agree with the history as documented  E-Cigarette/Vaping     E-Cigarette/Vaping Substances     Social History     Tobacco Use    Smoking status: Current Every Day Smoker     Types: E-Cigarettes    Smokeless tobacco: Never Used   Substance Use Topics    Alcohol use: Yes     Comment: rarely    Drug use: No       Review of Systems   Constitutional: Negative for chills, fever and unexpected weight change  HENT: Negative for congestion, sore throat and trouble swallowing  Eyes: Negative for pain, discharge and itching  Respiratory: Negative for cough, chest tightness, shortness of breath and wheezing  Cardiovascular: Negative for chest pain, palpitations and leg swelling  Gastrointestinal: Positive for nausea  Negative for abdominal pain, blood in stool, diarrhea and vomiting  Endocrine: Negative for polyuria  Genitourinary: Negative for difficulty urinating, dysuria, frequency and hematuria  Musculoskeletal: Negative for arthralgias and back pain  Neurological: Positive for dizziness, weakness and headaches  Negative for syncope and light-headedness  Physical Exam  Physical Exam  Vitals and nursing note reviewed  Constitutional:       General: He is not in acute distress  Appearance: He is well-developed  HENT:      Head: Normocephalic and atraumatic  Right Ear: External ear normal       Left Ear: External ear normal    Eyes:      Conjunctiva/sclera: Conjunctivae normal       Pupils: Pupils are equal, round, and reactive to light  Cardiovascular:      Rate and Rhythm: Normal rate and regular rhythm  Heart sounds: Normal heart sounds  No murmur heard  No friction rub  No gallop  Pulmonary:      Effort: Pulmonary effort is normal  No respiratory distress  Breath sounds: Normal breath sounds  No wheezing or rales  Abdominal:      General: Bowel sounds are normal  There is no distension  Palpations: Abdomen is soft  Tenderness: There is no abdominal tenderness  There is no guarding  Musculoskeletal:         General: No swelling, tenderness or deformity  Normal range of motion  Cervical back: Normal range of motion  Lymphadenopathy:      Cervical: No cervical adenopathy  Skin:     General: Skin is warm and dry  Neurological:      General: No focal deficit present  Mental Status: He is alert and oriented to person, place, and time  Mental status is at baseline  Cranial Nerves: No cranial nerve deficit  Sensory: No sensory deficit  Motor: No weakness or abnormal muscle tone     Psychiatric:         Behavior: Behavior normal          Vital Signs  ED Triage Vitals [09/09/21 2107]   Temperature Pulse Respirations Blood Pressure SpO2   98 5 °F (36 9 °C) 70 16 124/79 100 %      Temp Source Heart Rate Source Patient Position - Orthostatic VS BP Location FiO2 (%)   Oral Monitor Lying Left arm --      Pain Score       --           Vitals:    09/09/21 2107   BP: 124/79   Pulse: 70   Patient Position - Orthostatic VS: Lying         Visual Acuity      ED Medications  Medications   sodium chloride 0 9 % bolus 1,000 mL (0 mL Intravenous Stopped 9/10/21 0027)       Diagnostic Studies  Results Reviewed     Procedure Component Value Units Date/Time    Comprehensive metabolic panel [[id number]]  (Abnormal) Collected: 09/09/21 2154    Lab Status: Final result Specimen: Blood from Arm, Left Updated: 09/09/21 2235     Sodium 142 mmol/L      Potassium 3 8 mmol/L      Chloride 106 mmol/L      CO2 29 mmol/L      ANION GAP 7 mmol/L      BUN 19 mg/dL      Creatinine 1 07 mg/dL      Glucose 138 mg/dL      Calcium 10 0 mg/dL      AST 17 U/L      ALT 34 U/L Alkaline Phosphatase 66 U/L      Total Protein 7 3 g/dL      Albumin 4 7 g/dL      Total Bilirubin 0 40 mg/dL      eGFR 86 ml/min/1 73sq m     Narrative:      [place] guidelines for Chronic Kidney Disease (CKD):     Stage 1 with normal or high GFR (GFR > 90 mL/min/1 73 square meters)    Stage 2 Mild CKD (GFR = 60-89 mL/min/1 73 square meters)    Stage 3A Moderate CKD (GFR = 45-59 mL/min/1 73 square meters)    Stage 3B Moderate CKD (GFR = 30-44 mL/min/1 73 square meters)    Stage 4 Severe CKD (GFR = 15-29 mL/min/1 73 square meters)    Stage 5 End Stage CKD (GFR <15 mL/min/1 73 square meters)  Note: GFR calculation is accurate only with a steady state creatinine    Troponin I [[id number]]  (Normal) Collected: 09/09/21 2154    Lab Status: Final result Specimen: Blood from Arm, Left Updated: 09/09/21 2235     Troponin I <0 03 ng/mL     CBC and differential [[id number]]  (Abnormal) Collected: 09/09/21 2154    Lab Status: Final result Specimen: Blood from Arm, Left Updated: 09/09/21 2222     WBC 7 20 Thousand/uL      RBC 5 00 Million/uL      Hemoglobin 14 8 g/dL      Hematocrit 42 4 %      MCV 85 fL      MCH 29 6 pg      MCHC 34 8 g/dL      RDW 13 2 %      MPV 7 7 fL      Platelets 605 Thousands/uL      Neutrophils Relative 70 %      Lymphocytes Relative 24 %      Monocytes Relative 6 %      Eosinophils Relative 1 %      Basophils Relative 0 %      Neutrophils Absolute 5 00 Thousands/µL      Lymphocytes Absolute 1 70 Thousands/µL      Monocytes Absolute 0 40 Thousand/µL      Eosinophils Absolute 0 00 Thousand/µL      Basophils Absolute 0 00 Thousands/µL                  CT head without contrast   Final Result by Holden Orozco MD (09/09 2319)      No acute intracranial abnormality                    Workstation performed: [id number]         XR chest 1 view portable   ED Interpretation by Marjorie Gavin DO (09/09 2208)   No acute cardiopulmonary disease                  Procedures  ECG 12 Lead Documentation Only    Date/Time: 9/9/2021 9:48 PM  Performed by: Meg Grullon DO  Authorized by: Meg Grullon DO     Indications / Diagnosis:  Dizziness  ECG reviewed by me, the ED Provider: yes    Patient location:  ED  Previous ECG:     Previous ECG:  Unavailable    Comparison to cardiac monitor: Yes    Interpretation:     Interpretation: normal    Rate:     ECG rate:  71    ECG rate assessment: normal    Rhythm:     Rhythm: sinus rhythm    Ectopy:     Ectopy: none    QRS:     QRS axis:  Normal  Conduction:     Conduction: normal    ST segments:     ST segments:  Normal  T waves:     T waves: normal               ED Course                                           MDM  Number of Diagnoses or Management Options  Diagnosis management comments: 80-year-old male presenting for evaluation of dizziness, mild headache, nausea, generalized weakness  Claims that it happened about an hour after taking a dose of amoxicillin  Is concerned that he is having allergic reaction  No lip, tongue, throat swelling, no difficulty swallowing, no wheezing, no diffuse rashes  Vitals within normal limits, patient no distress, no focal neurologic deficits  Will obtain cardiac workup  Given headache with dizziness, will obtain CT head without contrast   Will give IV fluids        Disposition  Final diagnoses:   Dizziness   Nausea   SOB (shortness of breath)   Tooth infection     Time reflects when diagnosis was documented in both MDM as applicable and the Disposition within this note     Time User Action Codes Description Comment    9/9/2021 11:31 PM Chris Stai Add [R42] Dizziness     9/9/2021 11:31 PM Armaan Chava [R11 0] Nausea     9/9/2021 11:31 PM Chris Stai Add [R06 02] SOB (shortness of breath)     9/9/2021 11:32 PM Chris Stai Add [K04 7] Tooth infection       ED Disposition     ED Disposition Condition Date/Time Comment    Discharge Stable Thu Sep 9, 2021 11:31 PM Jewels Earthly discharge to home/self care             Follow-up Information     Follow up With Specialties Details Why [address] Candi Shepard MD Family Medicine Schedule an appointment as soon as possible for a visit  For follow up of symptoms [facility] [address]  [address] [facility]  [address]  [phone]            Discharge Medication List as of 9/10/2021 12:19 AM      START taking these medications    Details   clindamycin (CLEOCIN) 150 mg capsule Take 3 capsules (450 mg total) by mouth 3 (three) times a day for 7 days, Starting u 9/9/2021, Until Thu 9/16/2021, Normal         CONTINUE these medications which have NOT CHANGED    Details   amoxicillin (AMOXIL) 500 mg capsule Take 1 capsule (500 mg total) by mouth every 8 (eight) hours for 7 days, Starting Tue 9/7/2021, Until Tue 9/14/2021, Normal      ibuprofen (MOTRIN) 600 mg tablet Take 1 tablet (600 mg total) by mouth every 8 (eight) hours as needed for mild pain or moderate pain, Starting Tue 9/7/2021, Normal      methocarbamol (ROBAXIN) 500 mg tablet Take 1 tablet (500 mg total) by mouth 2 (two) times a day as needed for muscle spasms for up to 10 days, Starting Wed 4/14/2021, Until Sat 4/24/2021, Normal      omeprazole (PriLOSEC) 20 mg delayed release capsule Take 1 capsule (20 mg total) by mouth daily, Starting Sat 8/7/2021, Normal           No discharge procedures on file      PDMP Review     None          ED Provider  Electronically Signed by           Kalyani Peña DO  09/10/21 2645

## 2022-01-21 ENCOUNTER — NURSE TRIAGE (OUTPATIENT)
Dept: OTHER | Facility: OTHER | Age: 42
End: 2022-01-21

## 2022-01-21 ENCOUNTER — OFFICE VISIT (OUTPATIENT)
Dept: URGENT CARE | Facility: CLINIC | Age: 42
End: 2022-01-21

## 2022-01-21 VITALS
WEIGHT: 200 LBS | BODY MASS INDEX: 30.31 KG/M2 | HEART RATE: 84 BPM | OXYGEN SATURATION: 98 % | RESPIRATION RATE: 18 BRPM | TEMPERATURE: 98.4 F | HEIGHT: 68 IN

## 2022-01-21 DIAGNOSIS — B34.9 VIRAL ILLNESS: Primary | ICD-10-CM

## 2022-01-21 DIAGNOSIS — R06.02 SOB (SHORTNESS OF BREATH): ICD-10-CM

## 2022-01-21 PROCEDURE — G0382 LEV 3 HOSP TYPE B ED VISIT: HCPCS

## 2022-01-21 PROCEDURE — U0003 INFECTIOUS AGENT DETECTION BY NUCLEIC ACID (DNA OR RNA); SEVERE ACUTE RESPIRATORY SYNDROME CORONAVIRUS 2 (SARS-COV-2) (CORONAVIRUS DISEASE [COVID-19]), AMPLIFIED PROBE TECHNIQUE, MAKING USE OF HIGH THROUGHPUT TECHNOLOGIES AS DESCRIBED BY CMS-2020-01-R: HCPCS

## 2022-01-21 PROCEDURE — U0005 INFEC AGEN DETEC AMPLI PROBE: HCPCS

## 2022-01-21 NOTE — PROGRESS NOTES
[facility]        NAME: Zohreh Sanchez is a 39 y o  male  : 1980    MRN: [id number]  DATE: 2022  TIME: 6:17 PM    Assessment and Plan   Viral illness [B34 9]  1  Viral illness     2  SOB (shortness of breath)  COVID Only -Office Collect         Patient Instructions       Follow up with PCP in 3-5 days  Proceed to  ER if symptoms worsen  Chief Complaint     Chief Complaint   Patient presents with    Shortness of Breath    Fatigue    Generalized Body Aches         History of Present Illness       Started with cold sx yesterday  Earlier today he started feeling worse  Congestion, ruuny nose, sore throat, SOB, intermittent cough, body aches and dizziness  Exposed to covid at work and 1 week ago when with his son in the hospital       Review of Systems   Review of Systems   Constitutional: Positive for fatigue  Negative for chills and fever  HENT: Positive for congestion, rhinorrhea and sore throat  Respiratory: Positive for cough and shortness of breath  Gastrointestinal: Negative for diarrhea, nausea and vomiting  Musculoskeletal: Positive for myalgias  Skin: Negative for rash  Neurological: Positive for dizziness  Negative for headaches           Current Medications       Current Outpatient Medications:     ibuprofen (MOTRIN) 600 mg tablet, Take 1 tablet (600 mg total) by mouth every 8 (eight) hours as needed for mild pain or moderate pain (Patient not taking: Reported on 2022 ), Disp: 30 tablet, Rfl: 0    methocarbamol (ROBAXIN) 500 mg tablet, Take 1 tablet (500 mg total) by mouth 2 (two) times a day as needed for muscle spasms for up to 10 days (Patient not taking: Reported on 2021), Disp: 20 tablet, Rfl: 0    omeprazole (PriLOSEC) 20 mg delayed release capsule, Take 1 capsule (20 mg total) by mouth daily (Patient not taking: Reported on 2022 ), Disp: 28 capsule, Rfl: 0    Current Allergies     Allergies as of 2022 - Reviewed 2022 Allergen Reaction Noted    Hydrocodone Anaphylaxis 11/23/2018            The following portions of the patient's history were reviewed and updated as appropriate: allergies, current medications, past family history, past medical history, past social history, past surgical history and problem list      Past Medical History:   Diagnosis Date    Allergic     Hypoglycemia        Past Surgical History:   Procedure Laterality Date    CHOLECYSTECTOMY      WRIST SURGERY         Family History   Problem Relation Age of Onset    Heart disease Mother     Diabetes Mother     Kidney disease Mother     Heart disease Father     Diabetes Father     Kidney disease Father          Medications have been verified  Objective   Pulse 84   Temp 98 4 °F (36 9 °C) (Temporal)   Resp 18   Ht 5' 8" (1 727 m)   Wt 90 7 kg (200 lb)   SpO2 98%   BMI 30 41 kg/m²   No LMP for male patient  Physical Exam     Physical Exam  Vitals and nursing note reviewed  Constitutional:       Appearance: He is well-developed  HENT:      Head: Normocephalic and atraumatic  Cardiovascular:      Rate and Rhythm: Normal rate and regular rhythm  Heart sounds: Normal heart sounds  Pulmonary:      Effort: Pulmonary effort is normal       Breath sounds: Normal breath sounds  Skin:     General: Skin is warm  Neurological:      Mental Status: He is alert

## 2022-01-21 NOTE — TELEPHONE ENCOUNTER
"I was vaping a little bit heavy tonight I'm having shortness of breath" Recommended to go inside of [facility] to seek treatment for shortness of breath  Reason for Disposition   [1] Wheezing AND [2] uses e-cigarettes (vaping)    Answer Assessment - Initial Assessment Questions  1  SYMPTOMS: "What symptoms are you having?" (e g , none, cough, difficulty breathing, headache, dizziness, nausea)      Shortness of breath  2  ONSET:  "When did the symptoms begin?"      Earlier tonight   3  E-CIGARETTES: "Do you use e-cigarettes (vaping)? "       Yes   4  PRODUCT USED: "What do you vape?" (e g , nicotine, THC, cannabis oil)      Unclear  5  HOW OFTEN: "How many times per week?"      Very Heavy  6  LAST 24 HOURS: "How many times in the last 24 hours?"      Very Heavy  7  TOBACCO SMOKER: "Do you smoke cigars or cigarettes?"      Unclear  8   PREGNANCY: "Is there any chance you are pregnant?"      No    Protocols used: E-CIGARETTES AND VAPING SYMPTOMS AND QUESTIONS-[facility]

## 2022-01-21 NOTE — TELEPHONE ENCOUNTER
Regarding: Covid - SOB since yesterday, feels like flu coming on, at [facility] in car  ----- Message from Emily Martin, [address] sent at 1/21/2022  5:19 PM EST -----  "I have been SOB since yesterday  I feel really run down, like coming down with a flu  I took my child to the hospital last week and I know I was expose to someone that had covid   I am sitting at the [facility] in my car "

## 2022-01-21 NOTE — PATIENT INSTRUCTIONS
Check MyChart for test results  If SARS-CoV-2 is positive it means you tested positive for Covid-19  Start taking Vitamin D3 2000 IU daily  Contact your family doctor to set up a follow up visit  They will monitor you and decide when you can come off quarantine and/or return to work  Drink plenty of fluids  If you have a pulse-oximeter and your oxygen levels drop below 90% you need to be re-evaluated  Try to lay prone (on your stomach) if possible  [address]    Your healthcare provider and/or public health staff have evaluated you and have determined that you do not need to remain in the hospital at this time  At this time you can be isolated at home where you will be monitored by staff from your local or state health department  You should carefully follow the prevention and isolation steps below until a healthcare provider or local or state health department says that you can return to your normal activities  Stay home except to get medical care    People who are mildly ill with COVID-19 are able to isolate at home during their illness  You should restrict activities outside your home, except for getting medical care  Do not go to work, school, or public areas  Avoid using public transportation, ride-sharing, or taxis  Separate yourself from other people and animals in your home    People: As much as possible, you should stay in a specific room and away from other people in your home  Also, you should use a separate bathroom, if available  Animals: You should restrict contact with pets and other animals while you are sick with COVID-19, just like you would around other people  Although there have not been reports of pets or other animals becoming sick with COVID-19, it is still recommended that people sick with COVID-19 limit contact with animals until more information is known about the virus   When possible, have another member of your household care for your animals while you are sick  If you are sick with COVID-19, avoid contact with your pet, including petting, snuggling, being kissed or licked, and sharing food  If you must care for your pet or be around animals while you are sick, wash your hands before and after you interact with pets and wear a facemask  See COVID-19 and Animals for more information  Call ahead before visiting your doctor    If you have a medical appointment, call the healthcare provider and tell them that you have or may have COVID-19  This will help the healthcare providers office take steps to keep other people from getting infected or exposed  Wear a facemask    You should wear a facemask when you are around other people (e g , sharing a room or vehicle) or pets and before you enter a healthcare providers office  If you are not able to wear a facemask (for example, because it causes trouble breathing), then people who live with you should not stay in the same room with you, or they should wear a facemask if they enter your room  Cover your coughs and sneezes    Cover your mouth and nose with a tissue when you cough or sneeze  Throw used tissues in a lined trash can  Immediately wash your hands with soap and water for at least 20 seconds or, if soap and water are not available, clean your hands with an alcohol-based hand  that contains at least 60% alcohol  Clean your hands often    Wash your hands often with soap and water for at least 20 seconds, especially after blowing your nose, coughing, or sneezing; going to the bathroom; and before eating or preparing food  If soap and water are not readily available, use an alcohol-based hand  with at least 60% alcohol, covering all surfaces of your hands and rubbing them together until they feel dry  Soap and water are the best option if hands are visibly dirty  Avoid touching your eyes, nose, and mouth with unwashed hands      Avoid sharing personal household items    You should not share dishes, drinking glasses, cups, eating utensils, towels, or bedding with other people or pets in your home  After using these items, they should be washed thoroughly with soap and water  Clean all high-touch surfaces everyday    High touch surfaces include counters, tabletops, doorknobs, bathroom fixtures, toilets, phones, keyboards, tablets, and bedside tables  Also, clean any surfaces that may have blood, stool, or body fluids on them  Use a household cleaning spray or wipe, according to the label instructions  Labels contain instructions for safe and effective use of the cleaning product including precautions you should take when applying the product, such as wearing gloves and making sure you have good ventilation during use of the product  Monitor your symptoms    Seek prompt medical attention if your illness is worsening (e g , difficulty breathing)  Before seeking care, call your healthcare provider and tell them that you have, or are being evaluated for, COVID-19  Put on a facemask before you enter the facility  These steps will help the healthcare providers office to keep other people in the office or waiting room from getting infected or exposed  Ask your healthcare provider to call the local or [facility] health department  Persons who are placed under active monitoring or facilitated self-monitoring should follow instructions provided by their local health department or occupational health professionals, as appropriate  If you have a medical emergency and need to call 911, notify the dispatch personnel that you have, or are being evaluated for COVID-19  If possible, put on a facemask before emergency medical services arrive      Discontinuing home isolation    Patients with confirmed COVID-19 should remain under home isolation precautions until the following conditions are met:   - They have had no fever for at least 24 hours (that is one full day of no fever without the use medicine that reduces fevers)  AND  - other symptoms have improved (for example, when their cough or shortness of breath have improved)  AND  - If had mild or moderate illness, at least 10 days have passed since their symptoms first appeared or if severe illness (needed oxygen) or immunosuppressed, at least 20 days have passed since symptoms first appeared  Patients with confirmed COVID-19 should also notify close contacts (including their workplace) and ask that they self-quarantine  Currently, close contact is defined as being within 6 feet for 15 minutes or more from the period 24 hours starting 48 hours before symptom onset to the time at which the patient went into isolation  Close contacts of patients diagnosed with COVID-19 should be instructed by the patient to self-quarantine for 14 days from the last time of their last contact with the patient       Source: [employer]

## 2022-01-22 LAB — SARS-COV-2 RNA RESP QL NAA+PROBE: NEGATIVE

## 2023-05-10 ENCOUNTER — APPOINTMENT (OUTPATIENT)
Dept: RADIOLOGY | Facility: CLINIC | Age: 43
End: 2023-05-10

## 2023-05-10 ENCOUNTER — OFFICE VISIT (OUTPATIENT)
Dept: URGENT CARE | Facility: CLINIC | Age: 43
End: 2023-05-10

## 2023-05-10 VITALS
HEART RATE: 88 BPM | RESPIRATION RATE: 18 BRPM | TEMPERATURE: 98 F | HEIGHT: 68 IN | BODY MASS INDEX: 32.58 KG/M2 | SYSTOLIC BLOOD PRESSURE: 127 MMHG | DIASTOLIC BLOOD PRESSURE: 74 MMHG | OXYGEN SATURATION: 99 % | WEIGHT: 215 LBS

## 2023-05-10 DIAGNOSIS — R05.3 CHRONIC COUGH: Primary | ICD-10-CM

## 2023-05-10 DIAGNOSIS — R05.3 CHRONIC COUGH: ICD-10-CM

## 2023-05-10 RX ORDER — ALBUTEROL SULFATE 90 UG/1
2 AEROSOL, METERED RESPIRATORY (INHALATION) EVERY 6 HOURS PRN
Qty: 8.5 G | Refills: 0 | Status: SHIPPED | OUTPATIENT
Start: 2023-05-10

## 2023-05-10 NOTE — PROGRESS NOTES
[facility]      NAME: Elena Gibson is a 43 y o  male  : 1980    MRN: [id number]  DATE: May 10, 2023  TIME: 9:00 AM    Assessment and Plan   Chronic cough [R05 3]  1  Chronic cough  XR chest pa & lateral    Ambulatory Referral to Pulmonology    albuterol (ProAir HFA) 90 mcg/act inhaler          Patient Instructions   I personally reviewed the CXR: No active pulmonary disease appreciated  Radiologist will do a final read  Inhaler prescribed for prn  Referral to pulmonology provided as well  ER with any worsening SOB or any CP  Patient agreeable to plan  To present to the ER if symptoms worsen  Chief Complaint     Chief Complaint   Patient presents with   • Cold Like Symptoms     Patient c/o SOB, cough, and wheezing that started over 6 months ago  Patient reports symptoms worsening over past 2 months  History of Present Illness   Elena Gibson presents to the clinic c/o    Almost 25yr 1-2 ppd smoker, quit a few years back  Currently vaping  Cough  This is a chronic problem  The current episode started more than 1 month ago  The problem has been unchanged  The problem occurs every few minutes  The cough is non-productive  Associated symptoms include shortness of breath and wheezing  Pertinent negatives include no chills, ear congestion, ear pain, eye redness, fever, headaches, nasal congestion, postnasal drip, sore throat or weight loss  Nothing aggravates the symptoms  He has tried nothing for the symptoms  The treatment provided no relief  There is no history of asthma  patient reports he was told her might have COPD, but never formally diagnosed       Review of Systems   Review of Systems   Constitutional: Negative for chills, diaphoresis, fatigue, fever and weight loss  HENT: Negative for ear discharge, ear pain, facial swelling, postnasal drip and sore throat  Eyes: Negative for photophobia, pain, discharge, redness, itching and visual disturbance     Respiratory: Positive for cough, shortness of breath and wheezing  Negative for apnea and chest tightness  Cardiovascular: Negative for palpitations  Skin: Negative for color change and wound  Neurological: Negative for dizziness and headaches  Hematological: Negative for adenopathy           Current Medications     Long-Term Medications   Medication Sig Dispense Refill   • ibuprofen (MOTRIN) 600 mg tablet Take 1 tablet (600 mg total) by mouth every 8 (eight) hours as needed for mild pain or moderate pain (Patient not taking: Reported on 1/21/2022) 30 tablet 0   • methocarbamol (ROBAXIN) 500 mg tablet Take 1 tablet (500 mg total) by mouth 2 (two) times a day as needed for muscle spasms for up to 10 days (Patient not taking: Reported on 8/7/2021) 20 tablet 0   • omeprazole (PriLOSEC) 20 mg delayed release capsule Take 1 capsule (20 mg total) by mouth daily (Patient not taking: Reported on 1/21/2022) 28 capsule 0       Current Allergies     Allergies as of 05/10/2023 - Reviewed 05/10/2023   Allergen Reaction Noted   • Hydrocodone Anaphylaxis 11/23/2018            The following portions of the patient's history were reviewed and updated as appropriate: allergies, current medications, past family history, past medical history, past social history, past surgical history and problem list   Past Medical History:   Diagnosis Date   • Allergic    • Hypoglycemia      Past Surgical History:   Procedure Laterality Date   • CHOLECYSTECTOMY     • WRIST SURGERY       Social History     Socioeconomic History   • Marital status: /Civil Union     Spouse name: Not on file   • Number of children: Not on file   • Years of education: Not on file   • Highest education level: Not on file   Occupational History   • Not on file   Tobacco Use   • Smoking status: Every Day     Types: E-Cigarettes   • Smokeless tobacco: Never   Vaping Use   • Vaping Use: Every day   Substance and Sexual Activity   • Alcohol use: Yes     Comment: rarely   • "Drug use: No   • Sexual activity: Yes   Other Topics Concern   • Not on file   Social History Narrative   • Not on file     Social Determinants of Health     Financial Resource Strain: Not on file   Food Insecurity: Not on file   Transportation Needs: Not on file   Physical Activity: Not on file   Stress: Not on file   Social Connections: Not on file   Intimate Partner Violence: Not on file   Housing Stability: Not on file       Objective   /74   Pulse 88   Temp 98 °F (36 7 °C) (Temporal)   Resp 18   Ht 5' 8\" (1 727 m)   Wt 97 5 kg (215 lb)   SpO2 99%   BMI 32 69 kg/m²      Physical Exam     Physical Exam  Vitals and nursing note reviewed  Constitutional:       General: He is not in acute distress  Appearance: He is well-developed  He is not diaphoretic  HENT:      Head: Normocephalic and atraumatic  Right Ear: Tympanic membrane and external ear normal       Left Ear: Tympanic membrane and external ear normal       Nose: Nose normal       Mouth/Throat:      Mouth: Mucous membranes are moist       Pharynx: No oropharyngeal exudate or posterior oropharyngeal erythema  Eyes:      General: No scleral icterus  Right eye: No discharge  Left eye: No discharge  Conjunctiva/sclera: Conjunctivae normal    Cardiovascular:      Rate and Rhythm: Normal rate and regular rhythm  Heart sounds: Normal heart sounds  No murmur heard  No friction rub  No gallop  Pulmonary:      Effort: Pulmonary effort is normal  No respiratory distress  Breath sounds: Wheezing (diffuse expiratory) present  No decreased breath sounds, rhonchi or rales  Skin:     General: Skin is warm and dry  Coloration: Skin is not pale  Findings: No erythema or rash  Neurological:      Mental Status: He is alert and oriented to person, place, and time  Psychiatric:         Behavior: Behavior normal          Thought Content:  Thought content normal          Judgment: Judgment normal  " Domingo Bansal PA-C

## 2023-06-12 PROBLEM — S52.009A: Status: RESOLVED | Noted: 2019-03-31 | Resolved: 2023-06-12

## 2023-06-13 ENCOUNTER — OFFICE VISIT (OUTPATIENT)
Dept: FAMILY MEDICINE CLINIC | Facility: CLINIC | Age: 43
End: 2023-06-13
Payer: COMMERCIAL

## 2023-06-13 VITALS
OXYGEN SATURATION: 98 % | DIASTOLIC BLOOD PRESSURE: 80 MMHG | BODY MASS INDEX: 32.92 KG/M2 | HEIGHT: 68 IN | TEMPERATURE: 97.1 F | SYSTOLIC BLOOD PRESSURE: 124 MMHG | HEART RATE: 80 BPM | RESPIRATION RATE: 18 BRPM | WEIGHT: 217.2 LBS

## 2023-06-13 DIAGNOSIS — Z00.00 ANNUAL PHYSICAL EXAM: Primary | ICD-10-CM

## 2023-06-13 DIAGNOSIS — Z11.59 NEED FOR HEPATITIS C SCREENING TEST: ICD-10-CM

## 2023-06-13 DIAGNOSIS — Z87.19 HISTORY OF BARRETT'S ESOPHAGUS: ICD-10-CM

## 2023-06-13 DIAGNOSIS — R10.13 EPIGASTRIC PAIN: ICD-10-CM

## 2023-06-13 DIAGNOSIS — R06.09 DYSPNEA ON EXERTION: ICD-10-CM

## 2023-06-13 DIAGNOSIS — F17.200 TOBACCO DEPENDENCE: ICD-10-CM

## 2023-06-13 DIAGNOSIS — E66.09 CLASS 1 OBESITY DUE TO EXCESS CALORIES WITH SERIOUS COMORBIDITY AND BODY MASS INDEX (BMI) OF 33.0 TO 33.9 IN ADULT: ICD-10-CM

## 2023-06-13 DIAGNOSIS — Z86.16 HISTORY OF COVID-19: ICD-10-CM

## 2023-06-13 DIAGNOSIS — F10.11 HISTORY OF ALCOHOL ABUSE: ICD-10-CM

## 2023-06-13 DIAGNOSIS — K21.9 GASTROESOPHAGEAL REFLUX DISEASE, UNSPECIFIED WHETHER ESOPHAGITIS PRESENT: ICD-10-CM

## 2023-06-13 DIAGNOSIS — R73.03 PRE-DIABETES: ICD-10-CM

## 2023-06-13 PROCEDURE — 99214 OFFICE O/P EST MOD 30 MIN: CPT | Performed by: INTERNAL MEDICINE

## 2023-06-13 PROCEDURE — 99386 PREV VISIT NEW AGE 40-64: CPT | Performed by: INTERNAL MEDICINE

## 2023-06-13 RX ORDER — POLYETHYLENE GLYCOL 3350 17 G
4 POWDER IN PACKET (EA) ORAL AS NEEDED
Qty: 100 EACH | Refills: 0 | Status: SHIPPED | OUTPATIENT
Start: 2023-06-13

## 2023-06-13 RX ORDER — OMEPRAZOLE 40 MG/1
40 CAPSULE, DELAYED RELEASE ORAL DAILY
Qty: 30 CAPSULE | Refills: 1 | Status: SHIPPED | OUTPATIENT
Start: 2023-06-13

## 2023-06-13 NOTE — PROGRESS NOTES
[facility]        NAME: Roverto Pepe is a 43 y o  male  : 1980    MRN: [id number]  DATE: 2023  TIME: 9:38 AM    Assessment and Plan   1  Annual physical exam    2  Gastroesophageal reflux disease, unspecified whether esophagitis present  -Pt reports history of Magaña's esophagus, overdue for EGD  Will try to get records from last EGD  Needs to establish with GI here  Recommend labs, abdominal US due to history of alcohol abuse, epigastric pain  Resume daily PPI  Discussed dietary modifications, nicotine cessation    - Comprehensive metabolic panel; Future  -     CBC and Platelet; Future  -     Comprehensive metabolic panel  -     CBC and Platelet  -     omeprazole (PriLOSEC) 40 MG capsule; Take 1 capsule (40 mg total) by mouth daily  -     US abdomen complete; Future; Expected date: 2023  -     Ambulatory Referral to Gastroenterology; Future    3  History of Magaña's esophagus  -see above, last EGD in [facility] over 3 years ago      - Comprehensive metabolic panel; Future  -     CBC and Platelet; Future  -     Comprehensive metabolic panel  -     CBC and Platelet  -     Ambulatory Referral to Gastroenterology; Future    4  Pre-diabetes  -     Hemoglobin A1C; Future    5  Dyspnea on exertion  -     Comprehensive metabolic panel; Future  -     CBC and Platelet; Future  -     Comprehensive metabolic panel  -     CBC and Platelet  -     Complete PFT with post bronchodilator; Future    6  Class 1 obesity due to excess calories with serious comorbidity and body mass index (BMI) of 33 0 to 33 9 in adult  -     Lipid panel; Future  -     Hemoglobin A1C; Future  -     Comprehensive metabolic panel; Future  -     CBC and Platelet; Future  -     TSH, 3rd generation with Free T4 reflex; Future  -     Lipid panel  -     Comprehensive metabolic panel  -     CBC and Platelet  -     TSH, 3rd generation with Free T4 reflex    7   Need for hepatitis C screening test  -     Hepatitis C antibody; Future    8  Epigastric pain  -     US abdomen complete; Future; Expected date: 06/13/2023    9  History of alcohol abuse  -     US abdomen complete; Future; Expected date: 06/13/2023    10  History of COVID-19  -     Complete PFT with post bronchodilator; Future    11  Tobacco dependence  -     nicotine polacrilex (COMMIT) 4 MG lozenge; Apply 1 lozenge (4 mg total) to the mouth or throat as needed for smoking cessation             Chief Complaint     Chief Complaint   Patient presents with   • Establish Care     SOB over the last few months  Was evaluated at urgent care a couple of weeks ago  • Annual Exam         History of Present Illness       35yo male here to establish care  Last physical over 10 years ago  Reports history of Magaña's esophagus and pre-diabetes  Main concern today is SOB, cough and stomach issues  SOB: started about 2-3 months ago  Feels like he can't take a deep breath  Reports some SOB/BENNETT with exertion (climbing stairs)  Associated with dry cough  Denies chest pain, but does have epigastric pain radiating to his back  Denies history of asthma  Quit smoking cigarettes 5 years ago, but still vapes daily  Had COVID-19 December 2020  Wasn't hospitalized but sick for over a week  Denies recent COVID-19 infection  Was in urgent care last month due to chronic cough and SOB  CXR normal, was given albuterol inhaler, which he hasn't tried yet  Stomach issues: reports history of Magaña's esophagus, last EGD over 5 years ago in [facility]  Reports constant heartburn/reflux lately, coughing after eating  Admits to poor diet, eating fast food or gas station food  Limited fruits/vegetables  Reports history of alcohol abuse, sober 1 5 years  Previously 30 pack of beer daily  Currently taking tums daily, no longer on PPI (was concerned about side effects)  Also reports diarrhea - up to 4 loose stools per day  Has never had a colonoscopy       Fhx notable for diabetes in multiple first degree relatives  Social hx: works driving dump truck  History of substance abuse (intra-nasal only), sober 10 years  , with 2 step children  Review of Systems   Review of Systems   Constitutional: Positive for activity change and fatigue  Negative for chills, fever and unexpected weight change  HENT: Positive for congestion, dental problem and voice change  Negative for postnasal drip, rhinorrhea, sore throat and trouble swallowing  Eyes: Negative for pain, redness, itching and visual disturbance  Respiratory: Positive for cough, chest tightness and shortness of breath  Negative for wheezing  Cardiovascular: Positive for palpitations  Negative for chest pain and leg swelling  Gastrointestinal: Positive for abdominal pain and diarrhea  Negative for blood in stool, constipation, nausea and vomiting  Endocrine: Negative for cold intolerance, heat intolerance, polydipsia and polyuria  Genitourinary: Negative for dysuria, frequency, hematuria and urgency  Musculoskeletal: Negative for arthralgias, back pain and joint swelling  Skin: Negative for rash  Neurological: Negative for dizziness, tremors, weakness, light-headedness, numbness and headaches  Psychiatric/Behavioral: Positive for dysphoric mood and sleep disturbance  Negative for confusion, hallucinations and suicidal ideas  The patient is nervous/anxious            Current Medications       Current Outpatient Medications:   •  nicotine polacrilex (COMMIT) 4 MG lozenge, Apply 1 lozenge (4 mg total) to the mouth or throat as needed for smoking cessation, Disp: 100 each, Rfl: 0  •  omeprazole (PriLOSEC) 40 MG capsule, Take 1 capsule (40 mg total) by mouth daily, Disp: 30 capsule, Rfl: 1  •  albuterol (ProAir HFA) 90 mcg/act inhaler, Inhale 2 puffs every 6 (six) hours as needed for wheezing (Patient not taking: Reported on 6/13/2023), Disp: 8 5 g, Rfl: 0    Current Allergies     Allergies as of 06/13/2023 - Reviewed 06/13/2023 "  Allergen Reaction Noted   • Hydrocodone Anaphylaxis 11/23/2018            The following portions of the patient's history were reviewed and updated as appropriate: allergies, current medications, past family history, past medical history, past social history, past surgical history and problem list      Past Medical History:   Diagnosis Date   • Allergic    • Hypoglycemia        Past Surgical History:   Procedure Laterality Date   • CHOLECYSTECTOMY  2014   • WRIST SURGERY  1987    bone cyst       Family History   Problem Relation Age of Onset   • Heart disease Mother    • Diabetes Mother    • Kidney disease Mother    • Heart disease Father    • Diabetes Father    • Kidney disease Father          Medications have been verified  Objective   /80   Pulse 80   Temp (!) 97 1 °F (36 2 °C)   Resp 18   Ht 5' 8\" (1 727 m)   Wt 98 5 kg (217 lb 3 2 oz)   SpO2 98%   BMI 33 03 kg/m²        Physical Exam     Physical Exam  Vitals reviewed  Constitutional:       General: He is not in acute distress  Appearance: He is obese  HENT:      Head: Normocephalic and atraumatic  Right Ear: Tympanic membrane, ear canal and external ear normal       Left Ear: Tympanic membrane, ear canal and external ear normal       Nose:      Right Turbinates: Enlarged, swollen and pale  Left Turbinates: Enlarged, swollen and pale  Mouth/Throat:      Dentition: Abnormal dentition  Dental caries present  Pharynx: No pharyngeal swelling, oropharyngeal exudate, posterior oropharyngeal erythema or uvula swelling  Neck:      Thyroid: No thyromegaly  Cardiovascular:      Rate and Rhythm: Normal rate and regular rhythm  Heart sounds: S1 normal and S2 normal  No murmur heard  No friction rub  No gallop  Pulmonary:      Effort: Pulmonary effort is normal  No accessory muscle usage or prolonged expiration  Breath sounds: Normal breath sounds  No wheezing, rhonchi or rales     Abdominal:      General: " "Abdomen is flat  Bowel sounds are normal       Palpations: Abdomen is soft  There is no hepatomegaly or mass  Tenderness: There is no abdominal tenderness  There is no guarding or rebound  Lymphadenopathy:      Cervical: No cervical adenopathy  Neurological:      General: No focal deficit present  Mental Status: He is alert  Motor: Motor function is intact  Gait: Gait is intact  Psychiatric:         Mood and Affect: Mood and affect normal          Speech: Speech normal          Behavior: Behavior normal  Behavior is cooperative  Thought Content:  Thought content normal              Results:  Lab Results   Component Value Date    BUN 19 09/09/2021    CALCIUM 10 0 09/09/2021     09/09/2021    CO2 29 09/09/2021    CREATININE 1 07 09/09/2021    GLUC 138 (H) 09/09/2021    K 3 8 09/09/2021    SODIUM 142 09/09/2021       No results found for: \"HGBA1C\"    Lab Results   Component Value Date    HCT 42 4 09/09/2021    HGB 14 8 09/09/2021    MCV 85 09/09/2021     09/09/2021    WBC 7 20 09/09/2021         "

## 2023-06-13 NOTE — PROGRESS NOTES
[facility]    NAME: Aisha Crawford  AGE: 43 y o  SEX: male  : 1980     DATE: 2023     Assessment and Plan:     Problem List Items Addressed This Visit        Digestive    Gastroesophageal reflux disease    Relevant Medications    omeprazole (PriLOSEC) 40 MG capsule    Other Relevant Orders    Comprehensive metabolic panel    CBC and Platelet    US abdomen complete    Ambulatory Referral to Gastroenterology       Other    History of Magaña's esophagus    Relevant Orders    Comprehensive metabolic panel    CBC and Platelet    Ambulatory Referral to Gastroenterology    Pre-diabetes    Relevant Orders    Hemoglobin A1C    Dyspnea on exertion    Relevant Orders    Comprehensive metabolic panel    CBC and Platelet    Complete PFT with post bronchodilator    Class 1 obesity due to excess calories with serious comorbidity and body mass index (BMI) of 33 0 to 33 9 in adult    Relevant Orders    Lipid panel    Hemoglobin A1C    Comprehensive metabolic panel    CBC and Platelet    TSH, 3rd generation with Free T4 reflex    History of alcohol abuse    Relevant Orders    US abdomen complete    Tobacco dependence    Relevant Medications    nicotine polacrilex (COMMIT) 4 MG lozenge   Other Visit Diagnoses     Annual physical exam    -  Primary    Need for hepatitis C screening test        Relevant Orders    Hepatitis C antibody    Epigastric pain        Relevant Orders    US abdomen complete    History of COVID-19        Relevant Orders    Complete PFT with post bronchodilator          Immunizations and preventive care screenings were discussed with patient today  Appropriate education was printed on patient's after visit summary  Counseling:  Alcohol/drug use: discussed moderation in alcohol intake, the recommendations for healthy alcohol use, and avoidance of illicit drug use    Dental Health: discussed importance of regular tooth brushing, flossing, and dental visits  Injury prevention: discussed safety/seat belts, safety helmets, smoke detectors, carbon dioxide detectors, and smoking near bedding or upholstery  Sexual health: discussed sexually transmitted diseases, partner selection, use of condoms, avoidance of unintended pregnancy, and contraceptive alternatives  Exercise: the importance of regular exercise/physical activity was discussed  Recommend exercise 3-5 times per week for at least 30 minutes  BMI Counseling: Body mass index is 33 03 kg/m²  The BMI is above normal  Nutrition recommendations include encouraging healthy choices of fruits and vegetables, decreasing fast food intake, consuming healthier snacks and limiting drinks that contain sugar  Rationale for BMI follow-up plan is due to patient being overweight or obese  Depression Screening and Follow-up Plan: Patient was screened for depression during today's encounter  They screened negative with a PHQ-2 score of 0  Return in about 6 weeks (around 7/25/2023) for Recheck  Chief Complaint:     Chief Complaint   Patient presents with   • Establish Care     SOB over the last few months  Was evaluated at urgent care a couple of weeks ago  • Annual Exam      History of Present Illness:     Adult Annual Physical   Patient here for a comprehensive physical exam  The patient reports problems - refer to separate note  Diet and Physical Activity  Diet/Nutrition: poor diet  Exercise: no formal exercise  Depression Screening  PHQ-2/9 Depression Screening    Little interest or pleasure in doing things: 0 - not at all  Feeling down, depressed, or hopeless: 0 - not at all  PHQ-2 Score: 0  PHQ-2 Interpretation: Negative depression screen       General Health  Sleep: sleeps poorly, gets 4-6 hours of sleep on average and snores loudly  Hearing: normal - bilateral   Vision: no vision problems and most recent eye exam >1 year ago     Dental: no dental visits for >1 year          Health  Symptoms include: none     Review of Systems:     Review of Systems   Refer to separate note      Past Medical History:     Past Medical History:   Diagnosis Date   • Allergic    • Hypoglycemia       Past Surgical History:     Past Surgical History:   Procedure Laterality Date   • CHOLECYSTECTOMY  2014   • WRIST SURGERY  1987    bone cyst      Family History:     Family History   Problem Relation Age of Onset   • Heart disease Mother    • Diabetes Mother    • Kidney disease Mother    • Heart disease Father    • Diabetes Father    • Kidney disease Father       Social History:     Social History     Socioeconomic History   • Marital status: /Civil Union     Spouse name: None   • Number of children: None   • Years of education: None   • Highest education level: None   Occupational History   • None   Tobacco Use   • Smoking status: Former     Packs/day: 1 00     Years: 25 00     Total pack years: 25 00     Types: E-Cigarettes, Cigarettes   • Smokeless tobacco: Never   Vaping Use   • Vaping Use: Every day   Substance and Sexual Activity   • Alcohol use: Not Currently     Comment: quit drinking about 1 5   • Drug use: Not Currently     Comment: former substanec use, sober 10 yrs   • Sexual activity: Yes     Partners: Female     Birth control/protection: Female Sterilization   Other Topics Concern   • None   Social History Narrative   • None     Social Determinants of Health     Financial Resource Strain: Not on file   Food Insecurity: Not on file   Transportation Needs: Not on file   Physical Activity: Not on file   Stress: Not on file   Social Connections: Not on file   Intimate Partner Violence: Not on file   Housing Stability: Not on file      Current Medications:     Current Outpatient Medications   Medication Sig Dispense Refill   • nicotine polacrilex (COMMIT) 4 MG lozenge Apply 1 lozenge (4 mg total) to the mouth or throat as needed for smoking cessation 100 each 0   • "omeprazole (PriLOSEC) 40 MG capsule Take 1 capsule (40 mg total) by mouth daily 30 capsule 1   • albuterol (ProAir HFA) 90 mcg/act inhaler Inhale 2 puffs every 6 (six) hours as needed for wheezing (Patient not taking: Reported on 6/13/2023) 8 5 g 0     No current facility-administered medications for this visit  Allergies:      Allergies   Allergen Reactions   • Hydrocodone Anaphylaxis     Tolerates percocet, dilaudid      Physical Exam:     /80   Pulse 80   Temp (!) 97 1 °F (36 2 °C)   Resp 18   Ht 5' 8\" (1 727 m)   Wt 98 5 kg (217 lb 3 2 oz)   SpO2 98%   BMI 33 03 kg/m²     Physical Exam   Refer to separate note    Edith Gutierrez MD  [id number]  "

## 2023-06-13 NOTE — PATIENT INSTRUCTIONS
Wellness Visit for Adults   AMBULATORY CARE:   A wellness visit  is when you see your healthcare provider to get screened for health problems  Your healthcare provider will also give you advice on how to stay healthy  Write down your questions so you remember to ask them  Ask your healthcare provider how often you should have a wellness visit  What happens at a wellness visit:  Your healthcare provider will ask about your health, and your family history of health problems  This includes high blood pressure, heart disease, and cancer  He or she will ask if you have symptoms that concern you, if you smoke, and about your mood  You may also be asked about your intake of medicines, supplements, food, and alcohol  Any of the following may be done:  • Your weight  will be checked  Your height may also be checked so your body mass index (BMI) can be calculated  Your BMI shows if you are at a healthy weight  • Your blood pressure  and heart rate will be checked  Your temperature may also be checked  • Blood and urine tests  may be done  Blood tests may be done to check your cholesterol levels  Abnormal cholesterol levels increase your risk for heart disease and stroke  You may also need a blood or urine test to check for diabetes if you are at increased risk  Urine tests may be done to look for signs of an infection or kidney disease  • A physical exam  includes checking your heartbeat and lungs with a stethoscope  Your healthcare provider may also check your skin to look for sun damage  • Screening tests  may be recommended  A screening test is done to check for diseases that may not cause symptoms  The screening tests you may need depend on your age, gender, family history, and lifestyle habits  For example, colorectal screening may be recommended if you are 48years old or older  Screening tests you need if you are a woman:   • A Pap smear  is used to screen for cervical cancer   Pap smears are usually done every 3 to 5 years depending on your age  You may need them more often if you have had abnormal Pap smear test results in the past  Ask your healthcare provider how often you should have a Pap smear  • A mammogram  is an x-ray of your breasts to screen for breast cancer  Experts recommend mammograms every 2 years starting at age 48 years  You may need a mammogram at age 52 years or younger if you have an increased risk for breast cancer  Talk to your healthcare provider about when you should start having mammograms and how often you need them  Vaccines you may need:   • Get an influenza vaccine  every year  The influenza vaccine protects you from the flu  Several types of viruses cause the flu  The viruses change over time, so new vaccines are made each year  • Get a tetanus-diphtheria (Td) booster vaccine  every 10 years  This vaccine protects you against tetanus and diphtheria  Tetanus is a severe infection that may cause painful muscle spasms and lockjaw  Diphtheria is a severe bacterial infection that causes a thick covering in the back of your mouth and throat  • Get a human papillomavirus (HPV) vaccine  if you are female and aged 23 to 32 or male 23 to 24 and never received it  This vaccine protects you from HPV infection  HPV is the most common infection spread by sexual contact  HPV may also cause vaginal, penile, and anal cancers  • Get a pneumococcal vaccine  if you are aged 72 years or older  The pneumococcal vaccine is an injection given to protect you from pneumococcal disease  Pneumococcal disease is an infection caused by pneumococcal bacteria  The infection may cause pneumonia, meningitis, or an ear infection  • Get a shingles vaccine  if you are 60 or older, even if you have had shingles before  The shingles vaccine is an injection to protect you from the varicella-zoster virus  This is the same virus that causes chickenpox   Shingles is a painful rash that develops in people who had chickenpox or have been exposed to the virus  How to eat healthy:  My Plate is a model for planning healthy meals  It shows the types and amounts of foods that should go on your plate  Fruits and vegetables make up about half of your plate, and grains and protein make up the other half  A serving of dairy is included on the side of your plate  The amount of calories and serving sizes you need depends on your age, gender, weight, and height  Examples of healthy foods are listed below:  • Eat a variety of vegetables  such as dark green, red, and orange vegetables  You can also include canned vegetables low in sodium (salt) and frozen vegetables without added butter or sauces  • Eat a variety of fresh fruits , canned fruit in 100% juice, frozen fruit, and dried fruit  • Include whole grains  At least half of the grains you eat should be whole grains  Examples include whole-wheat bread, wheat pasta, brown rice, and whole-grain cereals such as oatmeal     • Eat a variety of protein foods such as seafood (fish and shellfish), lean meat, and poultry without skin (turkey and chicken)  Examples of lean meats include pork leg, shoulder, or tenderloin, and beef round, sirloin, tenderloin, and extra lean ground beef  Other protein foods include eggs and egg substitutes, beans, peas, soy products, nuts, and seeds  • Choose low-fat dairy products such as skim or 1% milk or low-fat yogurt, cheese, and cottage cheese  • Limit unhealthy fats  such as butter, hard margarine, and shortening  Exercise:  Exercise at least 30 minutes per day on most days of the week  Some examples of exercise include walking, biking, dancing, and swimming  You can also fit in more physical activity by taking the stairs instead of the elevator or parking farther away from stores  Include muscle strengthening activities 2 days each week  Regular exercise provides many health benefits   It helps you manage your weight, and decreases your risk for type 2 diabetes, heart disease, stroke, and high blood pressure  Exercise can also help improve your mood  Ask your healthcare provider about the best exercise plan for you  General health and safety guidelines:   • Do not smoke  Nicotine and other chemicals in cigarettes and cigars can cause lung damage  Ask your healthcare provider for information if you currently smoke and need help to quit  E-cigarettes or smokeless tobacco still contain nicotine  Talk to your healthcare provider before you use these products  • Limit alcohol  A drink of alcohol is 12 ounces of beer, 5 ounces of wine, or 1½ ounces of liquor  • Lose weight, if needed  Being overweight increases your risk of certain health conditions  These include heart disease, high blood pressure, type 2 diabetes, and certain types of cancer  • Protect your skin  Do not sunbathe or use tanning beds  Use sunscreen with a SPF 15 or higher  Apply sunscreen at least 15 minutes before you go outside  Reapply sunscreen every 2 hours  Wear protective clothing, hats, and sunglasses when you are outside  • Drive safely  Always wear your seatbelt  Make sure everyone in your car wears a seatbelt  A seatbelt can save your life if you are in an accident  Do not use your cell phone when you are driving  This could distract you and cause an accident  Pull over if you need to make a call or send a text message  • Practice safe sex  Use latex condoms if are sexually active and have more than one partner  Your healthcare provider may recommend screening tests for sexually transmitted infections (STIs)  • Wear helmets, lifejackets, and protective gear  Always wear a helmet when you ride a bike or motorcycle, go skiing, or play sports that could cause a head injury  Wear protective equipment when you play sports  Wear a lifejacket when you are on a boat or doing water sports      © Copyright [employer] 2022 Information is for End User's use only and may not be sold, redistributed or otherwise used for commercial purposes  The above information is an  only  It is not intended as medical advice for individual conditions or treatments  Talk to your doctor, nurse or pharmacist before following any medical regimen to see if it is safe and effective for you  Obesity   AMBULATORY CARE:   Obesity  means your body mass index (BMI) is greater than 30  Your healthcare provider will use your age, height, and weight to measure your BMI  The risks of obesity include  many health problems, including injuries or physical disability  • Diabetes (high blood sugar level)    • High blood pressure or high cholesterol    • Heart disease    • Stroke    • Gallbladder or liver disease    • Cancer of the colon, breast, prostate, liver, or kidney    • Sleep apnea    • Arthritis or gout    Screening  is done to check for health conditions before you have signs or symptoms  If you are 28to 79years old, your blood sugar level may be checked every 3 years for signs of prediabetes or diabetes  Your healthcare provider will check your blood pressure at each visit  High blood pressure can lead to a stroke or other problems  Your provider may check for signs of heart disease, cancer, or other health problems  Seek care immediately if:   • You have a severe headache, confusion, or difficulty speaking  • You have weakness on one side of your body  • You have chest pain, sweating, or shortness of breath  Call your doctor if:   • You have symptoms of gallbladder or liver disease, such as pain in your upper abdomen  • You have knee or hip pain and discomfort while walking  • You have symptoms of diabetes, such as intense hunger and thirst, and frequent urination  • You have symptoms of sleep apnea, such as snoring or daytime sleepiness  • You have questions or concerns about your condition or care      Treatment for obesity  focuses on helping you lose weight to improve your health  Even a small decrease in BMI can reduce the risk for many health problems  Your healthcare provider will help you set a weight-loss goal   • Lifestyle changes  are the first step in treating obesity  These include making healthy food choices and getting regular physical activity  Your healthcare provider may suggest a weight-loss program that involves coaching, education, and therapy  • Medicine  may help you lose weight when it is used with a healthy foods and physical activity  • Surgery  can help you lose weight if you have obesity along with other health problems  Several types of weight-loss surgery are available  Ask your healthcare provider for more information  Tips for safe weight loss:   • Set small, realistic goals  An example of a small goal is to walk for 20 minutes 5 days a week  Anther goal is to lose 5% of your body weight  • Ask for support  Tell friends, family members, and coworkers about your goals  Ask someone to lose weight with you  You may also want to join a weight-loss support group  • Identify foods or triggers that may cause you to overeat  Remove tempting high-calorie foods from your home and workplace  Place a bowl of fresh fruit on your kitchen counter  If stress causes you to eat, find other ways to cope with stress  A counselor or therapist may be able to help you  • Track your daily calories and activity  Write down what you eat and drink  Also write down how many minutes of physical activity you do each day  • Track your weekly weight  Weigh yourself in the morning, before you eat or drink anything but after you use the bathroom  Use the same scale, in the same place, and in similar clothing each time  Only weigh yourself 1 to 2 times each week, or as directed  You may become discouraged if you weigh yourself every day  Eating changes:   You will need to eat 500 to 1,000 fewer calories each day than you currently eat to lose 1 to 2 pounds a week  The following changes will help you cut calories:  • Eat smaller portions  Use small plates, no larger than 9 inches in diameter  Fill your plate half full of fruits and vegetables  Measure your food using measuring cups until you know what a serving size looks like  • Eat 3 meals and 1 or 2 snacks each day  Plan your meals in advance  Jazmyne Hernández and eat at home most of the time  Eat slowly  Do not skip meals  Skipping meals can lead to overeating later in the day  This can make it harder for you to lose weight  Talk with a dietitian to help you make a meal plan and schedule that is right for you  • Eat fruits and vegetables at every meal   They are low in calories and high in fiber, which makes you feel full  Do not add butter, margarine, or cream sauce to vegetables  Use herbs to season steamed vegetables  • Eat less fat and fewer fried foods  Eat more baked or grilled chicken and fish  These protein sources are lower in calories and fat than red meat  Limit fast food  Dress your salads with olive oil and vinegar instead of bottled dressing  • Limit the amount of sugar you eat  Do not drink sugary beverages  Limit alcohol  Activity changes:  Physical activity is good for your body in many ways  It helps you burn calories and build strong muscles  It decreases stress and depression, and improves your mood  It can also help you sleep better  Talk to your healthcare provider before you begin an exercise program   • Exercise for at least 30 minutes 5 days a week  Start slowly  Set aside time each day for physical activity that you enjoy and that is convenient for you  It is best to do both weight training and an activity that increases your heart rate, such as walking, bicycling, or swimming  • Find ways to be more active  Do yard work and housecleaning  Walk up the stairs instead of using elevators   Spend your leisure time going to events that require walking, such as outdoor festivals or fairs  This extra physical activity can help you lose weight and keep it off  Follow up with your doctor as directed: You may need to meet with a dietitian  Write down your questions so you remember to ask them during your visits  © [employer] 2022 Information is for End User's use only and may not be sold, redistributed or otherwise used for commercial purposes  The above information is an  only  It is not intended as medical advice for individual conditions or treatments  Talk to your doctor, nurse or pharmacist before following any medical regimen to see if it is safe and effective for you  Wellness Visit for Adults   AMBULATORY CARE:   A wellness visit  is when you see your healthcare provider to get screened for health problems  Your healthcare provider will also give you advice on how to stay healthy  Write down your questions so you remember to ask them  Ask your healthcare provider how often you should have a wellness visit  What happens at a wellness visit:  Your healthcare provider will ask about your health, and your family history of health problems  This includes high blood pressure, heart disease, and cancer  He or she will ask if you have symptoms that concern you, if you smoke, and about your mood  You may also be asked about your intake of medicines, supplements, food, and alcohol  Any of the following may be done:  • Your weight  will be checked  Your height may also be checked so your body mass index (BMI) can be calculated  Your BMI shows if you are at a healthy weight  • Your blood pressure  and heart rate will be checked  Your temperature may also be checked  • Blood and urine tests  may be done  Blood tests may be done to check your cholesterol levels  Abnormal cholesterol levels increase your risk for heart disease and stroke   You may also need a blood or urine test to check for diabetes if you are at increased risk  Urine tests may be done to look for signs of an infection or kidney disease  • A physical exam  includes checking your heartbeat and lungs with a stethoscope  Your healthcare provider may also check your skin to look for sun damage  • Screening tests  may be recommended  A screening test is done to check for diseases that may not cause symptoms  The screening tests you may need depend on your age, gender, family history, and lifestyle habits  For example, colorectal screening may be recommended if you are 48years old or older  Screening tests you need if you are a woman:   • A Pap smear  is used to screen for cervical cancer  Pap smears are usually done every 3 to 5 years depending on your age  You may need them more often if you have had abnormal Pap smear test results in the past  Ask your healthcare provider how often you should have a Pap smear  • A mammogram  is an x-ray of your breasts to screen for breast cancer  Experts recommend mammograms every 2 years starting at age 48 years  You may need a mammogram at age 52 years or younger if you have an increased risk for breast cancer  Talk to your healthcare provider about when you should start having mammograms and how often you need them  Vaccines you may need:   • Get an influenza vaccine  every year  The influenza vaccine protects you from the flu  Several types of viruses cause the flu  The viruses change over time, so new vaccines are made each year  • Get a tetanus-diphtheria (Td) booster vaccine  every 10 years  This vaccine protects you against tetanus and diphtheria  Tetanus is a severe infection that may cause painful muscle spasms and lockjaw  Diphtheria is a severe bacterial infection that causes a thick covering in the back of your mouth and throat  • Get a human papillomavirus (HPV) vaccine  if you are female and aged 23 to 32 or male 23 to 24 and never received it  This vaccine protects you from HPV infection  HPV is the most common infection spread by sexual contact  HPV may also cause vaginal, penile, and anal cancers  • Get a pneumococcal vaccine  if you are aged 72 years or older  The pneumococcal vaccine is an injection given to protect you from pneumococcal disease  Pneumococcal disease is an infection caused by pneumococcal bacteria  The infection may cause pneumonia, meningitis, or an ear infection  • Get a shingles vaccine  if you are 60 or older, even if you have had shingles before  The shingles vaccine is an injection to protect you from the varicella-zoster virus  This is the same virus that causes chickenpox  Shingles is a painful rash that develops in people who had chickenpox or have been exposed to the virus  How to eat healthy:  My Plate is a model for planning healthy meals  It shows the types and amounts of foods that should go on your plate  Fruits and vegetables make up about half of your plate, and grains and protein make up the other half  A serving of dairy is included on the side of your plate  The amount of calories and serving sizes you need depends on your age, gender, weight, and height  Examples of healthy foods are listed below:  • Eat a variety of vegetables  such as dark green, red, and orange vegetables  You can also include canned vegetables low in sodium (salt) and frozen vegetables without added butter or sauces  • Eat a variety of fresh fruits , canned fruit in 100% juice, frozen fruit, and dried fruit  • Include whole grains  At least half of the grains you eat should be whole grains  Examples include whole-wheat bread, wheat pasta, brown rice, and whole-grain cereals such as oatmeal     • Eat a variety of protein foods such as seafood (fish and shellfish), lean meat, and poultry without skin (turkey and chicken)  Examples of lean meats include pork leg, shoulder, or tenderloin, and beef round, sirloin, tenderloin, and extra lean ground beef   Other protein foods include eggs and egg substitutes, beans, peas, soy products, nuts, and seeds  • Choose low-fat dairy products such as skim or 1% milk or low-fat yogurt, cheese, and cottage cheese  • Limit unhealthy fats  such as butter, hard margarine, and shortening  Exercise:  Exercise at least 30 minutes per day on most days of the week  Some examples of exercise include walking, biking, dancing, and swimming  You can also fit in more physical activity by taking the stairs instead of the elevator or parking farther away from stores  Include muscle strengthening activities 2 days each week  Regular exercise provides many health benefits  It helps you manage your weight, and decreases your risk for type 2 diabetes, heart disease, stroke, and high blood pressure  Exercise can also help improve your mood  Ask your healthcare provider about the best exercise plan for you  General health and safety guidelines:   • Do not smoke  Nicotine and other chemicals in cigarettes and cigars can cause lung damage  Ask your healthcare provider for information if you currently smoke and need help to quit  E-cigarettes or smokeless tobacco still contain nicotine  Talk to your healthcare provider before you use these products  • Limit alcohol  A drink of alcohol is 12 ounces of beer, 5 ounces of wine, or 1½ ounces of liquor  • Lose weight, if needed  Being overweight increases your risk of certain health conditions  These include heart disease, high blood pressure, type 2 diabetes, and certain types of cancer  • Protect your skin  Do not sunbathe or use tanning beds  Use sunscreen with a SPF 15 or higher  Apply sunscreen at least 15 minutes before you go outside  Reapply sunscreen every 2 hours  Wear protective clothing, hats, and sunglasses when you are outside  • Drive safely  Always wear your seatbelt  Make sure everyone in your car wears a seatbelt  A seatbelt can save your life if you are in an accident   Do not use your cell phone when you are driving  This could distract you and cause an accident  Pull over if you need to make a call or send a text message  • Practice safe sex  Use latex condoms if are sexually active and have more than one partner  Your healthcare provider may recommend screening tests for sexually transmitted infections (STIs)  • Wear helmets, lifejackets, and protective gear  Always wear a helmet when you ride a bike or motorcycle, go skiing, or play sports that could cause a head injury  Wear protective equipment when you play sports  Wear a lifejacket when you are on a boat or doing water sports  © Copyright [employer] 2022 Information is for End User's use only and may not be sold, redistributed or otherwise used for commercial purposes  The above information is an  only  It is not intended as medical advice for individual conditions or treatments  Talk to your doctor, nurse or pharmacist before following any medical regimen to see if it is safe and effective for you

## 2023-06-17 ENCOUNTER — HOSPITAL ENCOUNTER (OUTPATIENT)
Dept: ULTRASOUND IMAGING | Facility: HOSPITAL | Age: 43
Discharge: HOME | End: 2023-06-17
Payer: COMMERCIAL

## 2023-06-17 ENCOUNTER — APPOINTMENT (OUTPATIENT)
Dept: LAB | Facility: HOSPITAL | Age: 43
End: 2023-06-17
Payer: COMMERCIAL

## 2023-06-17 DIAGNOSIS — R73.03 PRE-DIABETES: ICD-10-CM

## 2023-06-17 DIAGNOSIS — E66.09 CLASS 1 OBESITY DUE TO EXCESS CALORIES WITH SERIOUS COMORBIDITY AND BODY MASS INDEX (BMI) OF 33.0 TO 33.9 IN ADULT: ICD-10-CM

## 2023-06-17 DIAGNOSIS — R10.13 EPIGASTRIC PAIN: ICD-10-CM

## 2023-06-17 DIAGNOSIS — K21.9 GASTROESOPHAGEAL REFLUX DISEASE, UNSPECIFIED WHETHER ESOPHAGITIS PRESENT: ICD-10-CM

## 2023-06-17 DIAGNOSIS — Z11.59 NEED FOR HEPATITIS C SCREENING TEST: ICD-10-CM

## 2023-06-17 DIAGNOSIS — F10.11 HISTORY OF ALCOHOL ABUSE: ICD-10-CM

## 2023-06-17 LAB
ALBUMIN SERPL BCP-MCNC: 4.4 G/DL (ref 3.5–5)
ALP SERPL-CCNC: 56 U/L (ref 34–104)
ALT SERPL W P-5'-P-CCNC: 30 U/L (ref 7–52)
ANION GAP SERPL CALCULATED.3IONS-SCNC: 6 MMOL/L (ref 4–13)
AST SERPL W P-5'-P-CCNC: 18 U/L (ref 13–39)
BILIRUB SERPL-MCNC: 0.54 MG/DL (ref 0.2–1)
BUN SERPL-MCNC: 18 MG/DL (ref 5–25)
CALCIUM SERPL-MCNC: 9.3 MG/DL (ref 8.4–10.2)
CHLORIDE SERPL-SCNC: 105 MMOL/L (ref 96–108)
CHOLEST SERPL-MCNC: 178 MG/DL
CO2 SERPL-SCNC: 29 MMOL/L (ref 21–32)
CREAT SERPL-MCNC: 1.28 MG/DL (ref 0.6–1.3)
ERYTHROCYTE [DISTWIDTH] IN BLOOD BY AUTOMATED COUNT: 12.9 % (ref 11.6–15.1)
EST. AVERAGE GLUCOSE BLD GHB EST-MCNC: 108 MG/DL
GFR SERPL CREATININE-BSD FRML MDRD: 68 ML/MIN/1.73SQ M
GLUCOSE P FAST SERPL-MCNC: 98 MG/DL (ref 65–99)
HBA1C MFR BLD: 5.4 %
HCT VFR BLD AUTO: 44.1 % (ref 36.5–49.3)
HCV AB SER QL: NORMAL
HDLC SERPL-MCNC: 34 MG/DL
HGB BLD-MCNC: 15.2 G/DL (ref 12–17)
LDLC SERPL CALC-MCNC: 99 MG/DL (ref 0–100)
MCH RBC QN AUTO: 29.7 PG (ref 26.8–34.3)
MCHC RBC AUTO-ENTMCNC: 34.5 G/DL (ref 31.4–37.4)
MCV RBC AUTO: 86 FL (ref 82–98)
NONHDLC SERPL-MCNC: 144 MG/DL
PLATELET # BLD AUTO: 180 THOUSANDS/UL (ref 149–390)
PMV BLD AUTO: 9.9 FL (ref 8.9–12.7)
POTASSIUM SERPL-SCNC: 5 MMOL/L (ref 3.5–5.3)
PROT SERPL-MCNC: 7 G/DL (ref 6.4–8.4)
RBC # BLD AUTO: 5.11 MILLION/UL (ref 3.88–5.62)
SODIUM SERPL-SCNC: 140 MMOL/L (ref 135–147)
TRIGL SERPL-MCNC: 223 MG/DL
TSH SERPL DL<=0.05 MIU/L-ACNC: 1.78 UIU/ML (ref 0.45–4.5)
WBC # BLD AUTO: 5.39 THOUSAND/UL (ref 4.31–10.16)

## 2023-06-17 PROCEDURE — 85027 COMPLETE CBC AUTOMATED: CPT

## 2023-06-17 PROCEDURE — 80061 LIPID PANEL: CPT

## 2023-06-17 PROCEDURE — 83036 HEMOGLOBIN GLYCOSYLATED A1C: CPT

## 2023-06-17 PROCEDURE — 80053 COMPREHEN METABOLIC PANEL: CPT

## 2023-06-17 PROCEDURE — 86803 HEPATITIS C AB TEST: CPT

## 2023-06-17 PROCEDURE — 84443 ASSAY THYROID STIM HORMONE: CPT

## 2023-06-17 PROCEDURE — 36415 COLL VENOUS BLD VENIPUNCTURE: CPT

## 2023-06-17 PROCEDURE — 76700 US EXAM ABDOM COMPLETE: CPT

## 2023-06-20 DIAGNOSIS — N18.2 CKD (CHRONIC KIDNEY DISEASE) STAGE 2, GFR 60-89 ML/MIN: Primary | ICD-10-CM

## 2023-06-22 ENCOUNTER — HOSPITAL ENCOUNTER (OUTPATIENT)
Dept: PULMONOLOGY | Facility: HOSPITAL | Age: 43
End: 2023-06-22
Attending: INTERNAL MEDICINE
Payer: COMMERCIAL

## 2023-06-22 DIAGNOSIS — Z86.16 HISTORY OF COVID-19: ICD-10-CM

## 2023-06-22 DIAGNOSIS — R06.09 DYSPNEA ON EXERTION: ICD-10-CM

## 2023-06-22 PROCEDURE — 94060 EVALUATION OF WHEEZING: CPT

## 2023-06-22 PROCEDURE — 94729 DIFFUSING CAPACITY: CPT

## 2023-06-22 PROCEDURE — 94726 PLETHYSMOGRAPHY LUNG VOLUMES: CPT

## 2023-06-22 PROCEDURE — 94760 N-INVAS EAR/PLS OXIMETRY 1: CPT

## 2023-06-22 RX ORDER — ALBUTEROL SULFATE 2.5 MG/3ML
2.5 SOLUTION RESPIRATORY (INHALATION) ONCE AS NEEDED
Status: COMPLETED | OUTPATIENT
Start: 2023-06-22 | End: 2023-06-22

## 2023-06-22 RX ADMIN — ALBUTEROL SULFATE 2.5 MG: 2.5 SOLUTION RESPIRATORY (INHALATION) at 08:32

## 2023-06-24 ENCOUNTER — HOSPITAL ENCOUNTER (OUTPATIENT)
Dept: ULTRASOUND IMAGING | Facility: HOSPITAL | Age: 43
Discharge: HOME | End: 2023-06-24
Payer: COMMERCIAL

## 2023-06-24 DIAGNOSIS — N18.2 CKD (CHRONIC KIDNEY DISEASE) STAGE 2, GFR 60-89 ML/MIN: ICD-10-CM

## 2023-06-24 PROCEDURE — 76775 US EXAM ABDO BACK WALL LIM: CPT

## 2023-06-27 ENCOUNTER — APPOINTMENT (OUTPATIENT)
Dept: LAB | Facility: CLINIC | Age: 43
End: 2023-06-27
Payer: COMMERCIAL

## 2023-06-27 DIAGNOSIS — N18.2 CKD (CHRONIC KIDNEY DISEASE) STAGE 2, GFR 60-89 ML/MIN: ICD-10-CM

## 2023-06-27 LAB
BACTERIA UR QL AUTO: NORMAL /HPF
BILIRUB UR QL STRIP: NEGATIVE
CLARITY UR: CLEAR
COLOR UR: NORMAL
CREAT UR-MCNC: 113 MG/DL
CREAT UR-MCNC: 113 MG/DL
GLUCOSE UR STRIP-MCNC: NEGATIVE MG/DL
HGB UR QL STRIP.AUTO: NEGATIVE
KETONES UR STRIP-MCNC: NEGATIVE MG/DL
LEUKOCYTE ESTERASE UR QL STRIP: NEGATIVE
MICROALBUMIN UR-MCNC: 6.1 MG/L (ref 0–20)
MICROALBUMIN/CREAT 24H UR: 5 MG/G CREATININE (ref 0–30)
NITRITE UR QL STRIP: NEGATIVE
NON-SQ EPI CELLS URNS QL MICRO: NORMAL /HPF
PH UR STRIP.AUTO: 6 [PH]
PROT UR STRIP-MCNC: NEGATIVE MG/DL
PROT UR-MCNC: 7 MG/DL
PROT/CREAT UR: 0.06 MG/G{CREAT} (ref 0–0.1)
RBC #/AREA URNS AUTO: NORMAL /HPF
SP GR UR STRIP.AUTO: 1.02 (ref 1–1.03)
UROBILINOGEN UR STRIP-ACNC: <2 MG/DL
WBC #/AREA URNS AUTO: NORMAL /HPF

## 2023-06-27 PROCEDURE — 82570 ASSAY OF URINE CREATININE: CPT | Performed by: INTERNAL MEDICINE

## 2023-06-27 PROCEDURE — 84156 ASSAY OF PROTEIN URINE: CPT | Performed by: INTERNAL MEDICINE

## 2023-06-27 PROCEDURE — 82570 ASSAY OF URINE CREATININE: CPT

## 2023-06-27 PROCEDURE — 81001 URINALYSIS AUTO W/SCOPE: CPT

## 2023-06-27 PROCEDURE — 82043 UR ALBUMIN QUANTITATIVE: CPT

## 2023-07-03 ENCOUNTER — TELEPHONE (OUTPATIENT)
Dept: FAMILY MEDICINE CLINIC | Facility: CLINIC | Age: 43
End: 2023-07-03

## 2023-07-03 NOTE — TELEPHONE ENCOUNTER
Please advise pt that US and urine studies all normal, so not suggestive of CKD. But if Cr remains elevated for his age, may have him see nephrologist for further evaluation. Will discuss at his appointment next month.

## 2023-07-03 NOTE — TELEPHONE ENCOUNTER
Patient inquiring about recent kidney / bladder US. Patient states he saw results via mychart. He is questioning if he has CKD. Please advise.

## 2023-07-13 ENCOUNTER — CONSULT (OUTPATIENT)
Dept: GASTROENTEROLOGY | Facility: CLINIC | Age: 43
End: 2023-07-13
Payer: COMMERCIAL

## 2023-07-13 VITALS
BODY MASS INDEX: 32.1 KG/M2 | HEIGHT: 68 IN | RESPIRATION RATE: 16 BRPM | TEMPERATURE: 98 F | OXYGEN SATURATION: 98 % | DIASTOLIC BLOOD PRESSURE: 80 MMHG | WEIGHT: 211.8 LBS | HEART RATE: 92 BPM | SYSTOLIC BLOOD PRESSURE: 118 MMHG

## 2023-07-13 DIAGNOSIS — R19.4 CHANGE IN BOWEL HABITS: ICD-10-CM

## 2023-07-13 DIAGNOSIS — K62.5 RECTAL BLEEDING: ICD-10-CM

## 2023-07-13 DIAGNOSIS — Z87.19 HISTORY OF BARRETT'S ESOPHAGUS: ICD-10-CM

## 2023-07-13 DIAGNOSIS — R10.11 RIGHT UPPER QUADRANT ABDOMINAL PAIN: ICD-10-CM

## 2023-07-13 DIAGNOSIS — K21.9 GASTROESOPHAGEAL REFLUX DISEASE, UNSPECIFIED WHETHER ESOPHAGITIS PRESENT: Primary | ICD-10-CM

## 2023-07-13 PROCEDURE — 99244 OFF/OP CNSLTJ NEW/EST MOD 40: CPT | Performed by: NURSE PRACTITIONER

## 2023-07-13 RX ORDER — BISACODYL 5 MG/1
5 TABLET, DELAYED RELEASE ORAL DAILY PRN
Qty: 2 TABLET | Refills: 0 | Status: SHIPPED | OUTPATIENT
Start: 2023-07-13

## 2023-07-13 RX ORDER — OMEPRAZOLE 40 MG/1
CAPSULE, DELAYED RELEASE ORAL
Qty: 60 CAPSULE | Refills: 2 | Status: SHIPPED | OUTPATIENT
Start: 2023-07-13

## 2023-07-13 RX ORDER — FAMOTIDINE 20 MG/1
TABLET, FILM COATED ORAL
Qty: 45 TABLET | Refills: 2 | Status: SHIPPED | OUTPATIENT
Start: 2023-07-13

## 2023-07-13 RX ORDER — POLYETHYLENE GLYCOL 3350, SODIUM SULFATE ANHYDROUS, SODIUM BICARBONATE, SODIUM CHLORIDE, POTASSIUM CHLORIDE 236; 22.74; 6.74; 5.86; 2.97 G/4L; G/4L; G/4L; G/4L; G/4L
4000 POWDER, FOR SOLUTION ORAL ONCE
Qty: 4000 ML | Refills: 0 | Status: SHIPPED | OUTPATIENT
Start: 2023-07-13 | End: 2023-07-13

## 2023-07-13 NOTE — PATIENT INSTRUCTIONS
Increase Omperazole 40 mg, 1 pill 2 x daily, 30 mins prior to a meal.  Start famotidine 20 mg, 1 pill 2 x daily AS NEEDED for break through reflux. CT Abdomen  Proceed with EGD/Colonoscopy. Schedule a f/u OV in 12 weeks. Scheduled date of EGD/colonoscopy (as of today):07/31/2023  Physician performing EGD/colonoscopy: jany  Location of EGD/colonoscopy:[facility]   Desired bowel prep reviewed with patient: Golytely   Instructions reviewed with patient by: La   Clearances:   none

## 2023-07-13 NOTE — PROGRESS NOTES
[facility] - Outpatient Consultation  Lawyer Foster 43 y.o. male MRN: [id number]  Encounter: [id number]          ASSESSMENT AND PLAN:    The patient presents today for an initial consultation for his worsening chronic GERD symptoms, with recent changes in his bowel habits and one instance of rectal bleeding with bright red blood in the toilet. Exam:  Oral mucosa normal upon visual inspection, without any sores, lesions, or ulcerations. Sclera without icterus and benign. Lung sounds CTA b/l. Normal S1 & S2 upon exam. Abdomen is moderately tender especially in the right greater than left upper quadrant with guarding and rebound tenderness, with faint bowel sounds x4. No edema noted of the b/l lower extremities noted upon exam today. Skin is non-icteric. 1. Gastroesophageal reflux disease, unspecified whether esophagitis present  2. History of Magaña's esophagus  The patient's reflux symptoms continue to worsen and with his history of Magaña's esophagus we will:    -Increase omeprazole to 40 mg twice daily.  -Start famotidine 20 mg 1 p.o. twice daily as needed for breakthrough reflux symptoms.  -Proceed with an EGD for evaluation of any underlying etiology that would explain his worsening symptoms and to monitor for his history of Magaña's esophagus. I discussed the risks of procedure with the patient including, but not limited to: bleeding, infection, sore throat, and perforation. The patient gave verbal understanding and is agreeable to proceed. 3. Right upper quadrant abdominal pain  In regards to the moderate right greater than left upper quadrant abdominal pain with rebound tenderness and guarding we will proceed with a CT of the abdomen and pelvis with contrast to evaluate for any other underlying etiology that could explain his pain symptoms. 4. Change in bowel habits  5.  Rectal bleeding  With the change in the patient's bowel habits and instance of rectal bleeding, I feel it is appropriate to proceed with a colonoscopy to evaluate for any underlying etiology. The patient was agreeable and verbalized an understanding. I discussed the risks of procedure with the patient including, but not limited to: bleeding, infection, perforation, and spleen injury. The patient gave verbal understanding and is agreeable to proceed. Bowel prep instructions were reviewed and given as ordered. - polyethylene glycol (Golytely) 4000 mL solution; Take 4,000 mL by mouth once for 1 dose Take 4000 mL by mouth once for 1 dose. Use as directed  Dispense: 4000 mL; Refill: 0  - bisacodyl (DULCOLAX) 5 mg EC tablet; Take 1 tablet (5 mg total) by mouth daily as needed for constipation Take 2 pills according to colon prep instructions. Dispense: 2 tablet; Refill: 0    The patient will schedule a follow up office visit in 12 weeks. The patient was agreeable and verbalized an understanding.     ______________________________________________________________________    HPI: The patient is a 43 y.o. male who presents today for a consultation regarding his worsening chronic GERD symptoms, with recent changes in his bowel habits and one instance of rectal bleeding with bright red blood in the toilet. The patient reports that he does have times where if he has a large bowel movement there is blood when he wipes, but most recently there was an instance of blood in the toilet and mixed with his stool. The patient reports that he has dealt with GERD symptoms since he was 23years old and reports that over the past few years it has just continued to worsen. He reports that he is having issues swallowing that he can swallow it but it seems to get stuck in his esophagus in the midsternal area that if he drinks something or gives it time and eventually goes down.   He reports that over the past 3 to 4 months he has noticed his very regular, formed bowel movements once a day switch to very loose and frequent 3-4 times a day bowel movements, especially bursting in the morning. The patient reports he has also had 2 incidences during the day when he would have a sudden onset of abdominal pain and need to have a bowel movement was not able to get to a bathroom fast enough and had an accident. The patient denies any chest pain, nausea, shortness of breath, vomiting, early satiety, decreased appetite, unplanned weight loss. Water Intake: 40 oz. Denied eating any raw fish, seafood, or sushi. The patient denies any bloating, consistent diarrhea, nocturnal BMs, constipation, straining, melena. Last BM: Today. Flatus: Yes. The patient denies any evidence of jaundice, fevers, vilma colored stools, swelling of the lower extremities, fatigue, or easy bruising. Reports intermittent consusion/fogginess. PMH/PSH: Alcohol abuse, GERD, Magaña's esophagus, obesity, cholecystectomy, wrist surgery. Abdominal/Chest Surgery: Denied  Colon Cancer: Denied  Any Cancer: Denied  Pre-Cancerous Polyps: Denied  Crohn's: Denied  Ulcerative Colitis: Denied    Tobacco/Vaping: Vape Nicotine Daily  ETOH: Denied Sober 1.5 years  Marijuana: Denied  Illicit Drug Use: Denied Over 10 years. FH:  Colon Cancer: Pt was adopted, but not that he knows of. Any Cancer: Denied  Family Members with Pre-Cancerous Polyps: Denied  Crohn's: Denied  Ulcerative Colitis: Denied    Meds: Omeprazole 40 mg daily  NSAID Use: Denied    Imaging: (6/17/23): Ultrasound of the abdomen: Normal. (3/29/19): CT of the chest, abdomen and pelvis with contrast: Normal.    Endoscopy History: EGD: ([id number]): No evidence of Barretts. COLONOSCOPY: (None)    REVIEW OF SYSTEMS:    CONSTITUTIONAL: Denies any fever, chills, rigors, and weight loss. HEENT: No earache or tinnitus. Denies hearing loss or visual disturbances. CARDIOVASCULAR: No chest pain or palpitations.    RESPIRATORY: Denies any cough, hemoptysis, shortness of breath or dyspnea on exertion. GASTROINTESTINAL: As noted in the History of Present Illness. GENITOURINARY: No problems with urination. Denies any hematuria or dysuria. NEUROLOGIC: No dizziness or vertigo, denies headaches. MUSCULOSKELETAL: Denies any muscle or joint pain. SKIN: Denies skin rashes or itching. ENDOCRINE: Denies excessive thirst. Denies intolerance to heat or cold. PSYCHOSOCIAL: Denies depression or anxiety. Denies any recent memory loss. Historical Information   Past Medical History:   Diagnosis Date   • Allergic    • GERD (gastroesophageal reflux disease) 2008    Barretts esophagus   • Hypoglycemia      Past Surgical History:   Procedure Laterality Date   • CHOLECYSTECTOMY  2014   • WRIST SURGERY  1987    bone cyst     Social History   Social History     Substance and Sexual Activity   Alcohol Use Not Currently    Comment: quit drinking about 1.5     Social History     Substance and Sexual Activity   Drug Use Not Currently    Comment: former substanec use, sober 10 yrs     Social History     Tobacco Use   Smoking Status Former   • Packs/day: 1.50   • Years: 24.00   • Total pack years: 36.00   • Types: Cigarettes   Smokeless Tobacco Never     Family History   Problem Relation Age of Onset   • Heart disease Mother    • Diabetes Mother    • Kidney disease Mother    • Heart disease Father    • Diabetes Father    • Kidney disease Father        Meds/Allergies       Current Outpatient Medications:   •  albuterol (ProAir HFA) 90 mcg/act inhaler  •  omeprazole (PriLOSEC) 40 MG capsule  •  nicotine polacrilex (COMMIT) 4 MG lozenge    Allergies   Allergen Reactions   • Hydrocodone Anaphylaxis     Tolerates percocet, dilaudid           Objective     Blood pressure 118/80, pulse 92, temperature 98 °F (36.7 °C), temperature source Temporal, resp. rate 16, height 5' 8" (1.727 m), weight 96.1 kg (211 lb 12.8 oz), SpO2 98 %. Body mass index is 32.2 kg/m².         PHYSICAL EXAM:      General Appearance:   Alert, cooperative, no distress   HEENT:   Normocephalic, atraumatic, anicteric. Neck:  Supple, symmetrical, trachea midline   Lungs:   Clear to auscultation bilaterally; no rales, rhonchi or wheezing; respirations unlabored    Heart[de-identified]   Regular rate and rhythm; no murmur, rub, or gallop. Abdomen:   Soft, non-tender, non-distended; normal bowel sounds; no masses, no organomegaly    Genitalia:   Deferred    Rectal:   Deferred    Extremities:  No cyanosis, clubbing or edema    Pulses:  2+ and symmetric    Skin:  No jaundice, rashes, or lesions    Lymph nodes:  No palpable cervical lymphadenopathy        Lab Results:   No visits with results within 1 Day(s) from this visit. Latest known visit with results is:   Appointment on 2023   Component Date Value   • Creatinine, Ur 2023 113.0    • Albumin,U,Random 2023 6.1    • Albumin Creat Ratio 2023 5    • Color, UA 2023 Light Yellow    • Clarity, UA 2023 Clear    • Specific Gravity, UA 2023 1.016    • pH, UA 2023 6.0    • Leukocytes, UA 2023 Negative    • Nitrite, UA 2023 Negative    • Protein, UA 2023 Negative    • Glucose, UA 2023 Negative    • Ketones, UA 2023 Negative    • Urobilinogen, UA 2023 <2.0    • Bilirubin, UA 2023 Negative    • Occult Blood, UA 2023 Negative    • RBC, UA 2023 None Seen    • WBC, UA 2023 None Seen    • Epithelial Cells 2023 Occasional    • Bacteria, UA 2023 None Seen          Radiology Results:   Complete PFT with post bronchodilator    Result Date: 2023  Narrative: Pulmonary Function Test Report Beverly Larson 43 y.o. male MRN: [id number] Date of Testin2023 Date of Interpretation: 2023 Requesting Physician: Sarahy Christianson Reason for Testing: Dyspnea on exertion Reference set for interpretation:  Procedure: The patient was taken to pulmonary function testing laboratory.   The patient demonstrated good effort and cooperation. The results of this test meet ATS standards for acceptability and repeatability. Data set appears appropriate for interpretation. Post bronchodilator testing performed after the administration of 2.5mg albuterol in 3cc normal saline administered via nebulizer per bronchodilator protocol. Results: FEV1/FVC Ratio: 80 % FEV1: 3.49 L 89% predicted Forced Vital Capacity: 4.37 L 90% predicted After administration of bronchodilator: No change Lung volumes: Total Lung Capacity 89% predicted Residual volume 78% predicted DLCO corrected for patients hemoglobin level: 88% predicted Flow volume loop: Normal Interpretation: • Normal spirometry, lung volumes and diffusion capacity • No change with bronchodilators Josué Montana D.O., [facility]     US kidney and bladder    Result Date: 6/30/2023  Narrative: RENAL ULTRASOUND INDICATION:   N18.2: Chronic kidney disease, stage 2 (mild). COMPARISON: Prior abdominal ultrasound study dated 6/17/2023. TECHNIQUE:   Ultrasound of the retroperitoneum was performed with a curvilinear transducer utilizing volumetric sweeps and still imaging techniques. FINDINGS: KIDNEYS: Symmetric and normal size. Right kidney:  11.0 x 4.7 x 5.0 cm. Volume 136.7 mL Left kidney:  10.9 x 4.2 x 5.4 cm. Volume 128.0 mL Right kidney Normal echogenicity and contour. No mass is identified. No hydronephrosis. No shadowing calculi. No perinephric fluid collections. Left kidney Normal echogenicity and contour. No mass is identified. No hydronephrosis. No shadowing calculi. No perinephric fluid collections. URETERS: Nonvisualized. BLADDER: Under distended limiting evaluation. No focal thickening or mass lesions. Bilateral ureteral jets detected. The prostate measures 3.6 x 3.3 x 3.9 cm for a volume of 24.4 cc. Impression: Within normal limits.  Workstation performed: [id number]     US abdomen complete    Result Date: 6/23/2023  Narrative: ABDOMEN ULTRASOUND, COMPLETE INDICATION:   K21.9: Gastro-esophageal reflux disease without esophagitis R10.13: Epigastric pain F10.11: Alcohol abuse, in remission. Diarrhea. Nausea. COMPARISON: Renal stone CT performed April 14, 2021 TECHNIQUE:   Real-time ultrasound of the abdomen was performed with a curvilinear transducer with both volumetric sweeps and still imaging techniques. FINDINGS: PANCREAS: Pancreatic tail is obscured by overlying bowel gas. Visualized portions of the pancreas are within normal limits. AORTA AND IVC:  Visualized portions are normal for patient age. LIVER: Size:  Within normal range. The liver measures 15.0 cm in the midclavicular line. Contour:  Surface contour is smooth. Parenchyma:  Echogenicity and echotexture are within normal limits. Solitary 6 mm echogenic nodule in segment 5 of the right hepatic lobe on image 73 is statistically most likely to represent benign finding such as subcentimeter hemangioma and in this patient with no history of primary malignancy, no additional work-up is recommended. No sonographically detectable suspicious solid mass identified. Limited imaging of the main portal vein shows it to be patent and hepatopetal. BILIARY: No gallbladder findings. No intrahepatic biliary dilatation. CBD measures 3.0 mm. No choledocholithiasis. KIDNEY: Right kidney measures 10.9 x 4.0 x 3.8  cm. Volume 87.3 mL Kidney within normal limits. Left kidney measures 11.6 x 4.2 x 4.8 cm. Volume 123.3 mL Kidney within normal limits. SPLEEN: At the upper limits of normal measuring 12.6 x 12.7 x 5.6 cm. Volume 471.4 mL Otherwise within normal limits. ASCITES:  None. Impression: No acute sonographic abnormality to account for the patient's symptoms.  Workstation performed: [id number]   Answers for HPI/ROS submitted by the patient on 7/6/2023  Chronicity: chronic  Onset: more than 1 year ago  Onset quality: undetermined  Frequency: daily  Episode duration: 4 Hours  Progression since onset: gradually worsening  Pain location: generalized abdominal region  Pain - numeric: 5/10  Pain quality: aching, a sensation of fullness  Radiates to: LLQ, LUQ, chest  anorexia: Yes  belching: Yes  diarrhea: Yes  flatus: Yes  frequency: Yes  nausea:  Yes  Aggravated by: eating  Relieved by: nothing  Diagnostic workup: ultrasound

## 2023-07-13 NOTE — H&P (VIEW-ONLY)
[facility] - Outpatient Consultation  Dee Dee Faustin 43 y.o. male MRN: [id number]  Encounter: [id number]          ASSESSMENT AND PLAN:    The patient presents today for an initial consultation for his worsening chronic GERD symptoms, with recent changes in his bowel habits and one instance of rectal bleeding with bright red blood in the toilet. Exam:  Oral mucosa normal upon visual inspection, without any sores, lesions, or ulcerations. Sclera without icterus and benign. Lung sounds CTA b/l. Normal S1 & S2 upon exam. Abdomen is moderately tender especially in the right greater than left upper quadrant with guarding and rebound tenderness, with faint bowel sounds x4. No edema noted of the b/l lower extremities noted upon exam today. Skin is non-icteric. 1. Gastroesophageal reflux disease, unspecified whether esophagitis present  2. History of Magaña's esophagus  The patient's reflux symptoms continue to worsen and with his history of Magaña's esophagus we will:    -Increase omeprazole to 40 mg twice daily.  -Start famotidine 20 mg 1 p.o. twice daily as needed for breakthrough reflux symptoms.  -Proceed with an EGD for evaluation of any underlying etiology that would explain his worsening symptoms and to monitor for his history of Magaña's esophagus. I discussed the risks of procedure with the patient including, but not limited to: bleeding, infection, sore throat, and perforation. The patient gave verbal understanding and is agreeable to proceed. 3. Right upper quadrant abdominal pain  In regards to the moderate right greater than left upper quadrant abdominal pain with rebound tenderness and guarding we will proceed with a CT of the abdomen and pelvis with contrast to evaluate for any other underlying etiology that could explain his pain symptoms. 4. Change in bowel habits  5.  Rectal bleeding  With the change in the patient's bowel habits and instance of rectal bleeding, I feel it is appropriate to proceed with a colonoscopy to evaluate for any underlying etiology. The patient was agreeable and verbalized an understanding. I discussed the risks of procedure with the patient including, but not limited to: bleeding, infection, perforation, and spleen injury. The patient gave verbal understanding and is agreeable to proceed. Bowel prep instructions were reviewed and given as ordered. - polyethylene glycol (Golytely) 4000 mL solution; Take 4,000 mL by mouth once for 1 dose Take 4000 mL by mouth once for 1 dose. Use as directed  Dispense: 4000 mL; Refill: 0  - bisacodyl (DULCOLAX) 5 mg EC tablet; Take 1 tablet (5 mg total) by mouth daily as needed for constipation Take 2 pills according to colon prep instructions. Dispense: 2 tablet; Refill: 0    The patient will schedule a follow up office visit in 12 weeks. The patient was agreeable and verbalized an understanding.     ______________________________________________________________________    HPI: The patient is a 43 y.o. male who presents today for a consultation regarding his worsening chronic GERD symptoms, with recent changes in his bowel habits and one instance of rectal bleeding with bright red blood in the toilet. The patient reports that he does have times where if he has a large bowel movement there is blood when he wipes, but most recently there was an instance of blood in the toilet and mixed with his stool. The patient reports that he has dealt with GERD symptoms since he was 23years old and reports that over the past few years it has just continued to worsen. He reports that he is having issues swallowing that he can swallow it but it seems to get stuck in his esophagus in the midsternal area that if he drinks something or gives it time and eventually goes down.   He reports that over the past 3 to 4 months he has noticed his very regular, formed bowel movements once a day switch to very loose and frequent 3-4 times a day bowel movements, especially bursting in the morning. The patient reports he has also had 2 incidences during the day when he would have a sudden onset of abdominal pain and need to have a bowel movement was not able to get to a bathroom fast enough and had an accident. The patient denies any chest pain, nausea, shortness of breath, vomiting, early satiety, decreased appetite, unplanned weight loss. Water Intake: 40 oz. Denied eating any raw fish, seafood, or sushi. The patient denies any bloating, consistent diarrhea, nocturnal BMs, constipation, straining, melena. Last BM: Today. Flatus: Yes. The patient denies any evidence of jaundice, fevers, vilma colored stools, swelling of the lower extremities, fatigue, or easy bruising. Reports intermittent consusion/fogginess. PMH/PSH: Alcohol abuse, GERD, Magaña's esophagus, obesity, cholecystectomy, wrist surgery. Abdominal/Chest Surgery: Denied  Colon Cancer: Denied  Any Cancer: Denied  Pre-Cancerous Polyps: Denied  Crohn's: Denied  Ulcerative Colitis: Denied    Tobacco/Vaping: Vape Nicotine Daily  ETOH: Denied Sober 1.5 years  Marijuana: Denied  Illicit Drug Use: Denied Over 10 years. FH:  Colon Cancer: Pt was adopted, but not that he knows of. Any Cancer: Denied  Family Members with Pre-Cancerous Polyps: Denied  Crohn's: Denied  Ulcerative Colitis: Denied    Meds: Omeprazole 40 mg daily  NSAID Use: Denied    Imaging: (6/17/23): Ultrasound of the abdomen: Normal. (3/29/19): CT of the chest, abdomen and pelvis with contrast: Normal.    Endoscopy History: EGD: ([id number]): No evidence of Barretts. COLONOSCOPY: (None)    REVIEW OF SYSTEMS:    CONSTITUTIONAL: Denies any fever, chills, rigors, and weight loss. HEENT: No earache or tinnitus. Denies hearing loss or visual disturbances. CARDIOVASCULAR: No chest pain or palpitations.    RESPIRATORY: Denies any cough, hemoptysis, shortness of breath or dyspnea on exertion. GASTROINTESTINAL: As noted in the History of Present Illness. GENITOURINARY: No problems with urination. Denies any hematuria or dysuria. NEUROLOGIC: No dizziness or vertigo, denies headaches. MUSCULOSKELETAL: Denies any muscle or joint pain. SKIN: Denies skin rashes or itching. ENDOCRINE: Denies excessive thirst. Denies intolerance to heat or cold. PSYCHOSOCIAL: Denies depression or anxiety. Denies any recent memory loss. Historical Information   Past Medical History:   Diagnosis Date   • Allergic    • GERD (gastroesophageal reflux disease) 2008    Barretts esophagus   • Hypoglycemia      Past Surgical History:   Procedure Laterality Date   • CHOLECYSTECTOMY  2014   • WRIST SURGERY  1987    bone cyst     Social History   Social History     Substance and Sexual Activity   Alcohol Use Not Currently    Comment: quit drinking about 1.5     Social History     Substance and Sexual Activity   Drug Use Not Currently    Comment: former substanec use, sober 10 yrs     Social History     Tobacco Use   Smoking Status Former   • Packs/day: 1.50   • Years: 24.00   • Total pack years: 36.00   • Types: Cigarettes   Smokeless Tobacco Never     Family History   Problem Relation Age of Onset   • Heart disease Mother    • Diabetes Mother    • Kidney disease Mother    • Heart disease Father    • Diabetes Father    • Kidney disease Father        Meds/Allergies       Current Outpatient Medications:   •  albuterol (ProAir HFA) 90 mcg/act inhaler  •  omeprazole (PriLOSEC) 40 MG capsule  •  nicotine polacrilex (COMMIT) 4 MG lozenge    Allergies   Allergen Reactions   • Hydrocodone Anaphylaxis     Tolerates percocet, dilaudid           Objective     Blood pressure 118/80, pulse 92, temperature 98 °F (36.7 °C), temperature source Temporal, resp. rate 16, height 5' 8" (1.727 m), weight 96.1 kg (211 lb 12.8 oz), SpO2 98 %. Body mass index is 32.2 kg/m².         PHYSICAL EXAM:      General Appearance:   Alert, cooperative, no distress   HEENT:   Normocephalic, atraumatic, anicteric. Neck:  Supple, symmetrical, trachea midline   Lungs:   Clear to auscultation bilaterally; no rales, rhonchi or wheezing; respirations unlabored    Heart[de-identified]   Regular rate and rhythm; no murmur, rub, or gallop. Abdomen:   Soft, non-tender, non-distended; normal bowel sounds; no masses, no organomegaly    Genitalia:   Deferred    Rectal:   Deferred    Extremities:  No cyanosis, clubbing or edema    Pulses:  2+ and symmetric    Skin:  No jaundice, rashes, or lesions    Lymph nodes:  No palpable cervical lymphadenopathy        Lab Results:   No visits with results within 1 Day(s) from this visit. Latest known visit with results is:   Appointment on 2023   Component Date Value   • Creatinine, Ur 2023 113.0    • Albumin,U,Random 2023 6.1    • Albumin Creat Ratio 2023 5    • Color, UA 2023 Light Yellow    • Clarity, UA 2023 Clear    • Specific Gravity, UA 2023 1.016    • pH, UA 2023 6.0    • Leukocytes, UA 2023 Negative    • Nitrite, UA 2023 Negative    • Protein, UA 2023 Negative    • Glucose, UA 2023 Negative    • Ketones, UA 2023 Negative    • Urobilinogen, UA 2023 <2.0    • Bilirubin, UA 2023 Negative    • Occult Blood, UA 2023 Negative    • RBC, UA 2023 None Seen    • WBC, UA 2023 None Seen    • Epithelial Cells 2023 Occasional    • Bacteria, UA 2023 None Seen          Radiology Results:   Complete PFT with post bronchodilator    Result Date: 2023  Narrative: Pulmonary Function Test Report Lauryn Montgomery 43 y.o. male MRN: [id number] Date of Testin2023 Date of Interpretation: 2023 Requesting Physician: Hilary Perez Reason for Testing: Dyspnea on exertion Reference set for interpretation:  Procedure: The patient was taken to pulmonary function testing laboratory.   The patient demonstrated good effort and cooperation. The results of this test meet ATS standards for acceptability and repeatability. Data set appears appropriate for interpretation. Post bronchodilator testing performed after the administration of 2.5mg albuterol in 3cc normal saline administered via nebulizer per bronchodilator protocol. Results: FEV1/FVC Ratio: 80 % FEV1: 3.49 L 89% predicted Forced Vital Capacity: 4.37 L 90% predicted After administration of bronchodilator: No change Lung volumes: Total Lung Capacity 89% predicted Residual volume 78% predicted DLCO corrected for patients hemoglobin level: 88% predicted Flow volume loop: Normal Interpretation: • Normal spirometry, lung volumes and diffusion capacity • No change with bronchodilators Lela Carl D.O., [facility]     US kidney and bladder    Result Date: 6/30/2023  Narrative: RENAL ULTRASOUND INDICATION:   N18.2: Chronic kidney disease, stage 2 (mild). COMPARISON: Prior abdominal ultrasound study dated 6/17/2023. TECHNIQUE:   Ultrasound of the retroperitoneum was performed with a curvilinear transducer utilizing volumetric sweeps and still imaging techniques. FINDINGS: KIDNEYS: Symmetric and normal size. Right kidney:  11.0 x 4.7 x 5.0 cm. Volume 136.7 mL Left kidney:  10.9 x 4.2 x 5.4 cm. Volume 128.0 mL Right kidney Normal echogenicity and contour. No mass is identified. No hydronephrosis. No shadowing calculi. No perinephric fluid collections. Left kidney Normal echogenicity and contour. No mass is identified. No hydronephrosis. No shadowing calculi. No perinephric fluid collections. URETERS: Nonvisualized. BLADDER: Under distended limiting evaluation. No focal thickening or mass lesions. Bilateral ureteral jets detected. The prostate measures 3.6 x 3.3 x 3.9 cm for a volume of 24.4 cc. Impression: Within normal limits.  Workstation performed: [id number]     US abdomen complete    Result Date: 6/23/2023  Narrative: ABDOMEN ULTRASOUND, COMPLETE INDICATION:   K21.9: Gastro-esophageal reflux disease without esophagitis R10.13: Epigastric pain F10.11: Alcohol abuse, in remission. Diarrhea. Nausea. COMPARISON: Renal stone CT performed April 14, 2021 TECHNIQUE:   Real-time ultrasound of the abdomen was performed with a curvilinear transducer with both volumetric sweeps and still imaging techniques. FINDINGS: PANCREAS: Pancreatic tail is obscured by overlying bowel gas. Visualized portions of the pancreas are within normal limits. AORTA AND IVC:  Visualized portions are normal for patient age. LIVER: Size:  Within normal range. The liver measures 15.0 cm in the midclavicular line. Contour:  Surface contour is smooth. Parenchyma:  Echogenicity and echotexture are within normal limits. Solitary 6 mm echogenic nodule in segment 5 of the right hepatic lobe on image 73 is statistically most likely to represent benign finding such as subcentimeter hemangioma and in this patient with no history of primary malignancy, no additional work-up is recommended. No sonographically detectable suspicious solid mass identified. Limited imaging of the main portal vein shows it to be patent and hepatopetal. BILIARY: No gallbladder findings. No intrahepatic biliary dilatation. CBD measures 3.0 mm. No choledocholithiasis. KIDNEY: Right kidney measures 10.9 x 4.0 x 3.8  cm. Volume 87.3 mL Kidney within normal limits. Left kidney measures 11.6 x 4.2 x 4.8 cm. Volume 123.3 mL Kidney within normal limits. SPLEEN: At the upper limits of normal measuring 12.6 x 12.7 x 5.6 cm. Volume 471.4 mL Otherwise within normal limits. ASCITES:  None. Impression: No acute sonographic abnormality to account for the patient's symptoms.  Workstation performed: [id number]   Answers for HPI/ROS submitted by the patient on 7/6/2023  Chronicity: chronic  Onset: more than 1 year ago  Onset quality: undetermined  Frequency: daily  Episode duration: 4 Hours  Progression since onset: gradually worsening  Pain location: generalized abdominal region  Pain - numeric: 5/10  Pain quality: aching, a sensation of fullness  Radiates to: LLQ, LUQ, chest  anorexia: Yes  belching: Yes  diarrhea: Yes  flatus: Yes  frequency: Yes  nausea:  Yes  Aggravated by: eating  Relieved by: nothing  Diagnostic workup: ultrasound

## 2023-07-21 ENCOUNTER — HOSPITAL ENCOUNTER (EMERGENCY)
Facility: HOSPITAL | Age: 43
Discharge: HOME | End: 2023-07-21
Attending: EMERGENCY MEDICINE
Payer: COMMERCIAL

## 2023-07-21 VITALS
BODY MASS INDEX: 32.34 KG/M2 | DIASTOLIC BLOOD PRESSURE: 81 MMHG | WEIGHT: 213.41 LBS | TEMPERATURE: 97.7 F | SYSTOLIC BLOOD PRESSURE: 135 MMHG | HEART RATE: 87 BPM | HEIGHT: 68 IN | RESPIRATION RATE: 18 BRPM | OXYGEN SATURATION: 98 %

## 2023-07-21 DIAGNOSIS — K02.9 DENTAL CARIES: ICD-10-CM

## 2023-07-21 DIAGNOSIS — K08.89 DENTALGIA: Primary | ICD-10-CM

## 2023-07-21 DIAGNOSIS — K03.81 CRACKED TOOTH: ICD-10-CM

## 2023-07-21 PROCEDURE — 99284 EMERGENCY DEPT VISIT MOD MDM: CPT | Performed by: EMERGENCY MEDICINE

## 2023-07-21 PROCEDURE — 96372 THER/PROPH/DIAG INJ SC/IM: CPT

## 2023-07-21 PROCEDURE — 99283 EMERGENCY DEPT VISIT LOW MDM: CPT

## 2023-07-21 RX ORDER — AMOXICILLIN 500 MG/1
500 CAPSULE ORAL 3 TIMES DAILY
Qty: 21 CAPSULE | Refills: 0 | Status: SHIPPED | OUTPATIENT
Start: 2023-07-21 | End: 2023-07-28

## 2023-07-21 RX ORDER — ACETAMINOPHEN 325 MG/1
650 TABLET ORAL ONCE
Status: COMPLETED | OUTPATIENT
Start: 2023-07-21 | End: 2023-07-21

## 2023-07-21 RX ORDER — CHLORHEXIDINE GLUCONATE 0.12 MG/ML
15 RINSE ORAL 2 TIMES DAILY
Qty: 120 ML | Refills: 0 | Status: SHIPPED | OUTPATIENT
Start: 2023-07-21 | End: 2023-07-25

## 2023-07-21 RX ORDER — KETOROLAC TROMETHAMINE 30 MG/ML
30 INJECTION, SOLUTION INTRAMUSCULAR; INTRAVENOUS ONCE
Status: COMPLETED | OUTPATIENT
Start: 2023-07-21 | End: 2023-07-21

## 2023-07-21 RX ORDER — AMOXICILLIN 500 MG/1
500 CAPSULE ORAL ONCE
Status: COMPLETED | OUTPATIENT
Start: 2023-07-21 | End: 2023-07-21

## 2023-07-21 RX ORDER — NAPROXEN 500 MG/1
500 TABLET ORAL 2 TIMES DAILY WITH MEALS
Qty: 30 TABLET | Refills: 0 | Status: SHIPPED | OUTPATIENT
Start: 2023-07-21

## 2023-07-21 RX ORDER — LIDOCAINE HYDROCHLORIDE 20 MG/ML
15 SOLUTION OROPHARYNGEAL ONCE
Status: COMPLETED | OUTPATIENT
Start: 2023-07-21 | End: 2023-07-21

## 2023-07-21 RX ADMIN — AMOXICILLIN 500 MG: 500 CAPSULE ORAL at 22:18

## 2023-07-21 RX ADMIN — Medication 15 ML: at 22:18

## 2023-07-21 RX ADMIN — KETOROLAC TROMETHAMINE 30 MG: 30 INJECTION, SOLUTION INTRAMUSCULAR at 22:19

## 2023-07-21 RX ADMIN — ACETAMINOPHEN 650 MG: 325 TABLET ORAL at 22:18

## 2023-07-22 NOTE — ED PROVIDER NOTES
History  Chief Complaint   Patient presents with   • Dental Pain     Pt reports starting today. Upper right broken tooth pain. Pt took acetaminophen and amoxicillin prior. 51-year-old male presents to the emergency department for evaluation of right upper dental pain. Patient has a cracked tooth in that region and states it progressively came more painful today. It is sensitive to touch. Denies any associated trismus, facial swelling, fevers or chills. No neck pain or stiffness. No difficulty swallowing or voice change. Patient denies any other complaints at this time. Prior to Admission Medications   Prescriptions Last Dose Informant Patient Reported? Taking? albuterol (ProAir HFA) 90 mcg/act inhaler Past Month Self No Yes   Sig: Inhale 2 puffs every 6 (six) hours as needed for wheezing   bisacodyl (DULCOLAX) 5 mg EC tablet Not Taking  No No   Sig: Take 1 tablet (5 mg total) by mouth daily as needed for constipation Take 2 pills according to colon prep instructions. Patient not taking: Reported on 7/21/2023   famotidine (PEPCID) 20 mg tablet Past Month  No Yes   Sig: Take 1 PO BID PRN for break through reflux. nicotine polacrilex (COMMIT) 4 MG lozenge Not Taking Self No No   Sig: Apply 1 lozenge (4 mg total) to the mouth or throat as needed for smoking cessation   Patient not taking: Reported on 7/13/2023   omeprazole (PriLOSEC) 40 MG capsule 7/21/2023  No Yes   Sig: Take 1 PO BID.   polyethylene glycol (Golytely) 4000 mL solution   No No   Sig: Take 4,000 mL by mouth once for 1 dose Take 4000 mL by mouth once for 1 dose.  Use as directed      Facility-Administered Medications: None       Past Medical History:   Diagnosis Date   • Allergic    • GERD (gastroesophageal reflux disease) 2008    Barretts esophagus   • Hypoglycemia        Past Surgical History:   Procedure Laterality Date   • CHOLECYSTECTOMY  2014   • WRIST SURGERY  1987    bone cyst       Family History   Problem Relation Age of Onset   • Heart disease Mother    • Diabetes Mother    • Kidney disease Mother    • Heart disease Father    • Diabetes Father    • Kidney disease Father      I have reviewed and agree with the history as documented. E-Cigarette/Vaping   • E-Cigarette Use Current Every Day User      E-Cigarette/Vaping Substances     Social History     Tobacco Use   • Smoking status: Former     Packs/day: 1.50     Years: 24.00     Total pack years: 36.00     Types: Cigarettes   • Smokeless tobacco: Never   Vaping Use   • Vaping Use: Every day   Substance Use Topics   • Alcohol use: Not Currently     Comment: quit drinking about 1.5   • Drug use: Not Currently     Comment: former substanec use, sober 10 yrs       Review of Systems   Constitutional: Negative for chills and fever. HENT: Positive for dental problem. Negative for ear pain and sore throat. Eyes: Negative for pain and visual disturbance. Respiratory: Negative for cough and shortness of breath. Cardiovascular: Negative for chest pain and palpitations. Gastrointestinal: Negative for abdominal pain and vomiting. Genitourinary: Negative for dysuria and hematuria. Musculoskeletal: Negative for arthralgias and back pain. Skin: Negative for color change and rash. Neurological: Negative for seizures and syncope. All other systems reviewed and are negative. Physical Exam  Physical Exam  Vitals and nursing note reviewed. Constitutional:       General: He is not in acute distress. HENT:      Head: Normocephalic and atraumatic. Jaw: There is normal jaw occlusion. No trismus. Right Ear: External ear normal.      Left Ear: External ear normal.      Nose: Nose normal.      Mouth/Throat:      Lips: Pink. No lesions. Mouth: Mucous membranes are moist.      Dentition: Abnormal dentition. Dental tenderness and dental caries present. No gingival swelling or dental abscesses. Pharynx: Oropharynx is clear. Uvula midline.      Eyes: Extraocular Movements: Extraocular movements intact. Conjunctiva/sclera: Conjunctivae normal.      Pupils: Pupils are equal, round, and reactive to light. Cardiovascular:      Rate and Rhythm: Normal rate and regular rhythm. Pulses: Normal pulses. Pulmonary:      Effort: Pulmonary effort is normal. No respiratory distress. Breath sounds: No stridor. Musculoskeletal:         General: No deformity. Normal range of motion. Cervical back: Normal range of motion and neck supple. No rigidity. Lymphadenopathy:      Cervical: No cervical adenopathy. Skin:     General: Skin is warm and dry. Capillary Refill: Capillary refill takes less than 2 seconds. Neurological:      General: No focal deficit present. Mental Status: He is alert and oriented to person, place, and time.    Psychiatric:         Mood and Affect: Mood normal.         Behavior: Behavior normal.         Vital Signs  ED Triage Vitals [07/21/23 2139]   Temperature Pulse Respirations Blood Pressure SpO2   97.7 °F (36.5 °C) 87 18 135/81 98 %      Temp Source Heart Rate Source Patient Position - Orthostatic VS BP Location FiO2 (%)   Temporal Monitor Sitting Right arm --      Pain Score       3           Vitals:    07/21/23 2139   BP: 135/81   Pulse: 87   Patient Position - Orthostatic VS: Sitting         Visual Acuity      ED Medications  Medications   ketorolac (TORADOL) injection 30 mg (has no administration in time range)   amoxicillin (AMOXIL) capsule 500 mg (has no administration in time range)   acetaminophen (TYLENOL) tablet 650 mg (has no administration in time range)   Lidocaine Viscous HCl (XYLOCAINE) 2 % mucosal solution 15 mL (has no administration in time range)       Diagnostic Studies  Results Reviewed     None                 No orders to display              Procedures  Procedures         ED Course                               SBIRT 20yo+    Flowsheet Row Most Recent Value   Initial Alcohol Screen: US AUDIT-C 1. How often do you have a drink containing alcohol? 0 Filed at: 07/21/2023 2143   2. How many drinks containing alcohol do you have on a typical day you are drinking? 0 Filed at: 07/21/2023 2143   3a. Male UNDER 65: How often do you have five or more drinks on one occasion? 0 Filed at: 07/21/2023 2143   Audit-C Score 0 Filed at: 07/21/2023 2143   CHOLO: How many times in the past year have you. .. Used an illegal drug or used a prescription medication for non-medical reasons? Never Filed at: 07/21/2023 2143                    Medical Decision Making  49-year-old male presents the emergency department for evaluation of dental pain. On exam, he is resting comfortably in bed in no acute distress. Vital signs reviewed and stable, patient has numerous dental caries as well as cracked teeth, no obvious abscess, no tongue elevation, submental space is soft, neck is supple without meningismus signs. Symptoms likely secondary to dental caries and cracked tooth, plan to treat symptomatically and discharged home with prescriptions for naproxen, amoxicillin, Peridex rinse. Patient advised to follow-up closely with dentist as he will likely require double dental extractions. He was given strict return precautions. Cracked tooth: acute illness or injury  Dental caries: acute illness or injury  Dentalgia: acute illness or injury  Risk  OTC drugs. Prescription drug management.           Disposition  Final diagnoses:   Dentalgia   Dental caries   Cracked tooth     Time reflects when diagnosis was documented in both MDM as applicable and the Disposition within this note     Time User Action Codes Description Comment    7/21/2023 10:05 PM Check, Na Adams [C56.62] Dentalgia     7/21/2023 10:05 PM CheckAngie [K02.9] Dental caries     7/21/2023 10:05 PM Check, Angie Weinstein [K03.81] Cracked tooth       ED Disposition     ED Disposition   Discharge    Condition   Stable    Date/Time   Fri Jul 21, 2023 10:05 PM Comment   Ronnie Phillip discharge to home/self care. Follow-up Information     Follow up With Specialties Details Why [facility] Emergency Department Emergency Medicine  If symptoms worsen [address] [place] [id number]  [address] [place] [address] Emergency Department, [address] [facility], [address]    Luisana Farnsworth MD Internal Medicine Schedule an appointment as soon as possible for a visit   [address] [facility] [address] [address]  Schedule an appointment as soon as possible for a visit   [address] [facility] [address] [phone]           Patient's Medications   Discharge Prescriptions    AMOXICILLIN (AMOXIL) 500 MG CAPSULE    Take 1 capsule (500 mg total) by mouth 3 (three) times a day for 7 days       Start Date: 7/21/2023 End Date: 7/28/2023       Order Dose: 500 mg       Quantity: 21 capsule    Refills: 0    CHLORHEXIDINE (PERIDEX) 0.12 % SOLUTION    Apply 15 mL to the mouth or throat 2 (two) times a day for 4 days       Start Date: 7/21/2023 End Date: 7/25/2023       Order Dose: 15 mL       Quantity: 120 mL    Refills: 0    NAPROXEN (NAPROSYN) 500 MG TABLET    Take 1 tablet (500 mg total) by mouth 2 (two) times a day with meals       Start Date: 7/21/2023 End Date: --       Order Dose: 500 mg       Quantity: 30 tablet    Refills: 0       No discharge procedures on file.     PDMP Review     None          ED Provider  Electronically Signed by           Jeana Garsia MD  07/21/23 4330

## 2023-07-22 NOTE — DISCHARGE INSTRUCTIONS
Take medications and use mouth rinse as prescribed    Follow-up with dentistry    Return for any worsening symptoms including worsening pain, neck stiffness, facial swelling, persistent fevers, difficulty breathing, or any other concerning symptoms.

## 2023-07-28 RX ORDER — SODIUM CHLORIDE 9 MG/ML
125 INJECTION, SOLUTION INTRAVENOUS CONTINUOUS
Status: CANCELLED | OUTPATIENT
Start: 2023-07-28

## 2023-07-31 ENCOUNTER — HOSPITAL ENCOUNTER (OUTPATIENT)
Dept: GASTROENTEROLOGY | Facility: MEDICAL CENTER | Age: 43
Setting detail: OUTPATIENT SURGERY
Discharge: HOME | End: 2023-07-31
Payer: COMMERCIAL

## 2023-07-31 ENCOUNTER — ANESTHESIA (OUTPATIENT)
Dept: GASTROENTEROLOGY | Facility: MEDICAL CENTER | Age: 43
End: 2023-07-31

## 2023-07-31 ENCOUNTER — ANESTHESIA EVENT (OUTPATIENT)
Dept: GASTROENTEROLOGY | Facility: MEDICAL CENTER | Age: 43
End: 2023-07-31

## 2023-07-31 VITALS
HEART RATE: 74 BPM | SYSTOLIC BLOOD PRESSURE: 99 MMHG | OXYGEN SATURATION: 98 % | HEIGHT: 68 IN | RESPIRATION RATE: 16 BRPM | WEIGHT: 213 LBS | DIASTOLIC BLOOD PRESSURE: 61 MMHG | TEMPERATURE: 97.5 F | BODY MASS INDEX: 32.28 KG/M2

## 2023-07-31 DIAGNOSIS — R19.4 CHANGE IN BOWEL HABITS: ICD-10-CM

## 2023-07-31 DIAGNOSIS — K62.5 RECTAL BLEEDING: ICD-10-CM

## 2023-07-31 DIAGNOSIS — Z87.19 HISTORY OF BARRETT'S ESOPHAGUS: ICD-10-CM

## 2023-07-31 DIAGNOSIS — K21.9 GASTROESOPHAGEAL REFLUX DISEASE, UNSPECIFIED WHETHER ESOPHAGITIS PRESENT: ICD-10-CM

## 2023-07-31 PROCEDURE — C1726 CATH, BAL DIL, NON-VASCULAR: HCPCS

## 2023-07-31 PROCEDURE — 45385 COLONOSCOPY W/LESION REMOVAL: CPT | Performed by: STUDENT IN AN ORGANIZED HEALTH CARE EDUCATION/TRAINING PROGRAM

## 2023-07-31 PROCEDURE — 43239 EGD BIOPSY SINGLE/MULTIPLE: CPT | Performed by: STUDENT IN AN ORGANIZED HEALTH CARE EDUCATION/TRAINING PROGRAM

## 2023-07-31 PROCEDURE — 43249 ESOPH EGD DILATION <30 MM: CPT | Performed by: STUDENT IN AN ORGANIZED HEALTH CARE EDUCATION/TRAINING PROGRAM

## 2023-07-31 PROCEDURE — 88305 TISSUE EXAM BY PATHOLOGIST: CPT | Performed by: PATHOLOGY

## 2023-07-31 PROCEDURE — 45380 COLONOSCOPY AND BIOPSY: CPT | Performed by: STUDENT IN AN ORGANIZED HEALTH CARE EDUCATION/TRAINING PROGRAM

## 2023-07-31 RX ORDER — LIDOCAINE HYDROCHLORIDE 20 MG/ML
INJECTION, SOLUTION EPIDURAL; INFILTRATION; INTRACAUDAL; PERINEURAL AS NEEDED
Status: DISCONTINUED | OUTPATIENT
Start: 2023-07-31 | End: 2023-07-31

## 2023-07-31 RX ORDER — PROPOFOL 10 MG/ML
INJECTION, EMULSION INTRAVENOUS AS NEEDED
Status: DISCONTINUED | OUTPATIENT
Start: 2023-07-31 | End: 2023-07-31

## 2023-07-31 RX ORDER — SODIUM CHLORIDE 9 MG/ML
125 INJECTION, SOLUTION INTRAVENOUS CONTINUOUS
Status: DISCONTINUED | OUTPATIENT
Start: 2023-07-31 | End: 2023-08-04 | Stop reason: HOSPADM

## 2023-07-31 RX ADMIN — PROPOFOL 40 MG: 10 INJECTION, EMULSION INTRAVENOUS at 10:58

## 2023-07-31 RX ADMIN — PROPOFOL 40 MG: 10 INJECTION, EMULSION INTRAVENOUS at 10:56

## 2023-07-31 RX ADMIN — PROPOFOL 30 MG: 10 INJECTION, EMULSION INTRAVENOUS at 11:07

## 2023-07-31 RX ADMIN — PROPOFOL 30 MG: 10 INJECTION, EMULSION INTRAVENOUS at 11:09

## 2023-07-31 RX ADMIN — PROPOFOL 200 MG: 10 INJECTION, EMULSION INTRAVENOUS at 10:48

## 2023-07-31 RX ADMIN — PROPOFOL 40 MG: 10 INJECTION, EMULSION INTRAVENOUS at 10:52

## 2023-07-31 RX ADMIN — PROPOFOL 30 MG: 10 INJECTION, EMULSION INTRAVENOUS at 11:15

## 2023-07-31 RX ADMIN — PROPOFOL 30 MG: 10 INJECTION, EMULSION INTRAVENOUS at 11:22

## 2023-07-31 RX ADMIN — PROPOFOL 30 MG: 10 INJECTION, EMULSION INTRAVENOUS at 11:26

## 2023-07-31 RX ADMIN — PROPOFOL 30 MG: 10 INJECTION, EMULSION INTRAVENOUS at 11:24

## 2023-07-31 RX ADMIN — LIDOCAINE HYDROCHLORIDE 100 MG: 20 INJECTION, SOLUTION EPIDURAL; INFILTRATION; INTRACAUDAL at 10:48

## 2023-07-31 RX ADMIN — PROPOFOL 30 MG: 10 INJECTION, EMULSION INTRAVENOUS at 11:29

## 2023-07-31 RX ADMIN — PROPOFOL 40 MG: 10 INJECTION, EMULSION INTRAVENOUS at 11:02

## 2023-07-31 RX ADMIN — PROPOFOL 20 MG: 10 INJECTION, EMULSION INTRAVENOUS at 11:31

## 2023-07-31 RX ADMIN — SODIUM CHLORIDE 125 ML/HR: 0.9 INJECTION, SOLUTION INTRAVENOUS at 10:25

## 2023-07-31 RX ADMIN — PROPOFOL 30 MG: 10 INJECTION, EMULSION INTRAVENOUS at 11:17

## 2023-07-31 RX ADMIN — PROPOFOL 30 MG: 10 INJECTION, EMULSION INTRAVENOUS at 11:05

## 2023-07-31 RX ADMIN — PROPOFOL 20 MG: 10 INJECTION, EMULSION INTRAVENOUS at 11:34

## 2023-07-31 RX ADMIN — PROPOFOL 40 MG: 10 INJECTION, EMULSION INTRAVENOUS at 11:00

## 2023-07-31 RX ADMIN — PROPOFOL 30 MG: 10 INJECTION, EMULSION INTRAVENOUS at 11:20

## 2023-07-31 RX ADMIN — PROPOFOL 30 MG: 10 INJECTION, EMULSION INTRAVENOUS at 11:12

## 2023-07-31 RX ADMIN — PROPOFOL 50 MG: 10 INJECTION, EMULSION INTRAVENOUS at 10:50

## 2023-07-31 RX ADMIN — PROPOFOL 40 MG: 10 INJECTION, EMULSION INTRAVENOUS at 10:54

## 2023-07-31 NOTE — INTERVAL H&P NOTE
H&P reviewed. After examining the patient I find no changes in the patients condition since the H&P had been written.     Vitals:    07/31/23 1027   BP: 123/76   Pulse: 84   Resp: 18   Temp: 97.5 °F (36.4 °C)   SpO2: 95%

## 2023-07-31 NOTE — ANESTHESIA POSTPROCEDURE EVALUATION
Post-Op Assessment Note    CV Status:  Stable    Pain management: adequate     Mental Status:  Alert and awake   Hydration Status:  Euvolemic   PONV Controlled:  Controlled   Airway Patency:  Patent      Post Op Vitals Reviewed: Yes      Staff: Anesthesiologist         No notable events documented.     BP      Temp      Pulse     Resp      SpO2      BP 99/61   Pulse 74   Temp 97.5 °F (36.4 °C) (Temporal)   Resp 16   Ht 5' 8" (1.727 m)   Wt 96.6 kg (213 lb)   SpO2 98%   BMI 32.39 kg/m²

## 2023-07-31 NOTE — ANESTHESIA PREPROCEDURE EVALUATION
Procedure:  COLONOSCOPY  EGD    Relevant Problems   CARDIO          GI/HEPATIC   (+) Gastroesophageal reflux disease          PULMONARY          (+) tobacco use  Physical Exam    Airway    Mallampati score: II  TM Distance: >3 FB  Neck ROM: full     Dental       Cardiovascular  Rhythm: regular, Rate: normal,     Pulmonary  Breath sounds clear to auscultation,     Other Findings        Anesthesia Plan  ASA Score- 2     Anesthesia Type- IV sedation with anesthesia with ASA Monitors. Additional Monitors:   Airway Plan:           Plan Factors-    Chart reviewed. Existing labs reviewed. Patient is a current smoker. Patient smoked on day of surgery. Induction- intravenous. Postoperative Plan-     Informed Consent- Anesthetic plan and risks discussed with patient.

## 2023-08-03 PROCEDURE — 88305 TISSUE EXAM BY PATHOLOGIST: CPT | Performed by: PATHOLOGY

## 2023-08-06 ENCOUNTER — HOSPITAL ENCOUNTER (OUTPATIENT)
Dept: CT IMAGING | Facility: HOSPITAL | Age: 43
Discharge: HOME | End: 2023-08-06
Payer: COMMERCIAL

## 2023-08-06 DIAGNOSIS — R10.11 RIGHT UPPER QUADRANT ABDOMINAL PAIN: ICD-10-CM

## 2023-08-06 PROCEDURE — 74177 CT ABD & PELVIS W/CONTRAST: CPT

## 2023-08-06 PROCEDURE — G1004 CDSM NDSC: HCPCS

## 2023-08-06 RX ADMIN — IOHEXOL 100 ML: 350 INJECTION, SOLUTION INTRAVENOUS at 09:29

## 2023-08-07 ENCOUNTER — OFFICE VISIT (OUTPATIENT)
Dept: GASTROENTEROLOGY | Facility: CLINIC | Age: 43
End: 2023-08-07
Payer: COMMERCIAL

## 2023-08-07 ENCOUNTER — TELEPHONE (OUTPATIENT)
Dept: GASTROENTEROLOGY | Facility: CLINIC | Age: 43
End: 2023-08-07

## 2023-08-07 VITALS
OXYGEN SATURATION: 98 % | WEIGHT: 213.8 LBS | HEART RATE: 92 BPM | HEIGHT: 68 IN | DIASTOLIC BLOOD PRESSURE: 78 MMHG | SYSTOLIC BLOOD PRESSURE: 124 MMHG | TEMPERATURE: 97.4 F | BODY MASS INDEX: 32.4 KG/M2

## 2023-08-07 DIAGNOSIS — R11.0 NAUSEA: Primary | ICD-10-CM

## 2023-08-07 DIAGNOSIS — Z87.19 HISTORY OF BARRETT'S ESOPHAGUS: ICD-10-CM

## 2023-08-07 DIAGNOSIS — Z86.010 PERSONAL HISTORY OF COLONIC POLYPS: ICD-10-CM

## 2023-08-07 DIAGNOSIS — K21.9 GASTROESOPHAGEAL REFLUX DISEASE, UNSPECIFIED WHETHER ESOPHAGITIS PRESENT: ICD-10-CM

## 2023-08-07 DIAGNOSIS — K62.5 BRBPR (BRIGHT RED BLOOD PER RECTUM): ICD-10-CM

## 2023-08-07 DIAGNOSIS — R19.4 CHANGE IN BOWEL HABIT: ICD-10-CM

## 2023-08-07 DIAGNOSIS — R13.10 DYSPHAGIA, UNSPECIFIED TYPE: ICD-10-CM

## 2023-08-07 PROCEDURE — 99214 OFFICE O/P EST MOD 30 MIN: CPT | Performed by: PHYSICIAN ASSISTANT

## 2023-08-07 NOTE — PROGRESS NOTES
[facility] - Outpatient Follow-up Note  Belen Cordova 43 y.o. male MRN: [id number]  Encounter: [id number]    ASSESSMENT AND PLAN:      1. Nausea  2. Gastroesophageal reflux disease, unspecified whether esophagitis present  3. History of Magaña's esophagus    Pt with hx of GERD symptoms and Magaña's esophagus (shares he was given this dx in early 25s) who underwent EGD in 07/2023 which demonstrated salmon colored mucosa though bx were negative for intestinal metaplasia, he also had gastritis and hiatal hernia. His typical GERD symptoms are well-controlled on daily PPI though he is having post-prandial nausea symptoms which are more chronic in nature. I wonder if there is a dysmotility component to his symptoms. Nausea less prevalent after PPI dosing escalated. Will give this a few weeks though if symptoms persist would recommend following through with GES. Recommend continuing with diet and lifestyle modifications for GERD. Recommend small frequent meals low in saturated fats and difficult to digest fibrous foods. Okay for PRN use of anti-emetics if needed. Pertaining to his history of Magaña's esophagus, it is not certain as to whether he currently carries this diagnosis versus a reflux esophagitis. I discussed his results with Dr. Sabino Watson. We could consider repeating an endoscopy for surveillance purposes at the time of his next colonoscopy to increase his sample size though if biopsies are repeatedly negative for Magaña's then we could consider discontinuing surveillance. We will plan to discuss this with him at follow-up. - NM gastric emptying; Future    4. Dysphagia     Hx of such, bx negative for EoE. Pt underwent balloon dilation from 18 -> 18 mm with mucosa tears post-dilation. Pt notes symptoms of dysphagia have abated since dilated and he notes improvement in breathing as well. Continue with dysphagia precautions. 5. Change in bowel habit  6. BRBPR    Pt had colonoscopy completed for hx of BRPBR. Colonoscopy demonstrated some erythematous mucosa and colon polyp though bx were negative for IBD. Suspect BRBPR was secondary to outlet bleeding, recommend fiber supplement to prevent straining and excellent hydration. At baseline most suspicious pt's chronic GI symptoms and alternating bowel habits are 2/2 to IBS +/- bile salt diarrhea. More acutely following his colonoscopy evaluation he has noted new onset of urgent loose/watery stools, non-bloody in nature. Given acute change, recommend checking stool studies for infectious pathogens at this time. Recommend starting on fiber supplementation. Hold off on anti-diarrheals until results of testing have returned. Pending results, could likely trial loperamide vs cholestyramine for symptomatic relief. He can look into low FODMAP diet. Trial IB Ramya. Trial anti-spasmodic, pt politely declines. - Clostridium difficile toxin by PCR with EIA; Future  - Ova and parasite examination; Future  - Pancreatic elastase, fecal; Future  - Stool Enteric Bacterial Panel by PCR; Future  - Giardia/Cryptosporidium EIA; Future  - Clostridium difficile toxin by PCR with EIA  - Ova and parasite examination  - Pancreatic elastase, fecal  - Stool Enteric Bacterial Panel by PCR    7. Personal history of colonic polyps    Noted, adenomatous polyp. Recommend repeat colonoscopy in 3 years given sub-optimal prep, taking him to 07/2026. We will follow up in approx 3 months to reassess symptoms. ______________________________________________________________________    SUBJECTIVE: Patient is a 43 y.o. male who presents today for follow-up regarding EGD and colonoscopy. Pmhx sig for Magaña's esophagus, GERD, tobacco abuse, hx of etoh abuse. History of cholecystectomy. Patient is new to me. He previously met with Marcela Rodarte in 07/2023.   At that time, he complained of an occurrence of rectal bleeding and worsening heartburn. He was complaining of dysphagia as well. He underwent bidirectional endoscopic evaluation and EGD demonstrated Magaña's mucosa though biopsies were unremarkable, and he underwent a dilation. His colonoscopy demonstrated some erythematous mucosa in the descending colon, though biopsies were unremarkable. 08/07/23:     Patient shares that he is not having any significant heartburn or indigestion. He notes some regurgitation at times. He is however continuing to have postprandial nausea. He denies any vomiting. Since the endoscopy with dilation, he is not having any significant dysphagia symptoms. He denies any abnormal weight loss in the past 6 months. Pertaining to bowel habits, he is having alternating bowel habits between constipation and diarrhea. At times with straining he notes some fresh appearing blood on wipe. Notes some abdominal cramping related to defecation which improves following defecation. He notes after the colonoscopy he started to develop and increase in urgent loose stools. Now having 4-5 soft to loose/liquid stools in a 24 hours period. He denies any steatorrhea or nocturnal BMs. He denies any weight loss over the past 6 months. NSAIDs: off and on as needed   Tobacco: none   Cannabis: former  Etoh: none     08/2023: CT A/P: Asymmetric CT nephrograms. Right kidney is in corticomedullary phase while the left is in the nephrographic phase. No hydroureteronephrosis. No renal or ureteral calculi identified. Recommend urology consultation  06/2023: RUQ US: No acute sonographic abnormality to account for the patient's symptoms  06/2023: Hb 15.2, Hct 44.1, MCV 86, Plt 180, BUN 18, Cr 1.28, AST 18, ALT 30, ALP 56, albumin 4.4, t bili 0.54, TSH 1.782     Endoscopic history:   EGD: 07/2023: 1 cm type I hiatal hernia;  C0M1 Magaña's esophagus; Dilated in the lower third of the esophagus with balloon dilator to 18 mm end size.  Dilation caused mucosal tears; Mild erythematous mucosa in the antrum  A. Duodenum, duodenal biopsy R/O celiac: Benign duodenal mucosa with focal gastric foveolar metaplasia, suggestive of peptic injury. No intraepithelial lymphocytosis and no villous blunting. No epithelial dysplasia and no evidence of malignancy. B. Stomach, Gastric R/O H Pylori: Gastric antral and oxyntic mucosa with chronic, inactive gastritis. Separate fragment of mixed gastric and small bowel mucosa, favor antral- duodenal transitional zone mucosa, correlation with endoscopic findings is suggested. Negative for dysplasia or carcinoma. No Helicobacter pylori is identified on H&E stained slides. C. Esophagogastric junction, ge junction R/O Barretts: Esophageal and adjacent cardiac mucosa with no pathologic abnormality. Negative for goblet cell intestinal metaplasia. No epithelial dysplasia and no evidence of malignancy. D. Esophagus, esophagus R/O EOE: Esophageal squamous mucosa with no significant pathologic alteration. No increase in intraepithelial eosinophils is seen  Colon: 07/2023: Large amount of adherent semi-liquid stool in the right colon; Mild, patchy edematous mucosa with loss of vascular pattern in the descending colon; One 6 mm sessile polyp in the sigmoid colon  E. Terminal Ileum, terminal ileum R/O IBD: Small intestinal mucosa with no pathologic abnormality. F. Colon, cecum asending tranverse R/O IBD: Colonic mucosa with no pathologic abnormality. No active or chronic colitis is seen. Negative for dysplasia. G. Colon, Descending 35-45 R/O IBD: Colonic mucosa with reactive changes. No active or chronic colitis is seen. Negative for dysplasia. H. Colon, sigmoid polyp cold snare: Tubular adenoma. No high grade dysplasia and no evidence of malignancy. I. Colon, sigmoid R/O IBD: Colonic mucosa with no pathologic abnormality. No active or chronic colitis is seen. Negative for dysplasia. J. Colon, rectal R/O IBD: Colonic mucosa with no pathologic abnormality.   No active or chronic colitis is seen. Negative for dysplasia. Review of Systems   Per HPI    Historical Information   Past Medical History:   Diagnosis Date   • Allergic    • GERD (gastroesophageal reflux disease) 2008    Barretts esophagus   • History of Magaña's esophagus    • Hypoglycemia      Past Surgical History:   Procedure Laterality Date   • CHOLECYSTECTOMY  2014   • EGD     • WRIST SURGERY  1987    bone cyst     Social History   Social History     Substance and Sexual Activity   Alcohol Use Not Currently    Comment: quit drinking about 1.5     Social History     Substance and Sexual Activity   Drug Use Not Currently    Comment: former substanec use, sober 10 yrs     Social History     Tobacco Use   Smoking Status Former   • Packs/day: 1.50   • Years: 24.00   • Total pack years: 36.00   • Types: Cigarettes   Smokeless Tobacco Never     Family History   Problem Relation Age of Onset   • Heart disease Mother    • Diabetes Mother    • Kidney disease Mother    • Heart disease Father    • Diabetes Father    • Kidney disease Father      Meds/Allergies       Current Outpatient Medications:   •  albuterol (ProAir HFA) 90 mcg/act inhaler  •  famotidine (PEPCID) 20 mg tablet  •  naproxen (Naprosyn) 500 mg tablet  •  omeprazole (PriLOSEC) 40 MG capsule  •  nicotine polacrilex (COMMIT) 4 MG lozenge    Allergies   Allergen Reactions   • Hydrocodone Anaphylaxis     Tolerates percocet, dilaudid     Objective     Blood pressure 124/78, pulse 92, temperature (!) 97.4 °F (36.3 °C), temperature source Temporal, height 5' 8" (1.727 m), weight 97 kg (213 lb 12.8 oz), SpO2 98 %. Body mass index is 32.51 kg/m². Physical Exam  Vitals and nursing note reviewed. Constitutional:       Appearance: Normal appearance. HENT:      Head: Normocephalic and atraumatic. Eyes:      General: No scleral icterus. Conjunctiva/sclera: Conjunctivae normal.   Cardiovascular:      Rate and Rhythm: Normal rate.    Pulmonary:      Effort: Pulmonary effort is normal. No respiratory distress. Abdominal:      General: Bowel sounds are normal. There is no distension. Palpations: Abdomen is soft. There is no mass. Tenderness: There is no abdominal tenderness. There is no guarding. Skin:     General: Skin is warm and dry. Coloration: Skin is not jaundiced. Neurological:      General: No focal deficit present. Mental Status: He is alert and oriented to person, place, and time. Psychiatric:         Mood and Affect: Mood normal.         Behavior: Behavior normal.         Lab Results:   No visits with results within 1 Day(s) from this visit.    Latest known visit with results is:   Hospital Outpatient Visit on 07/31/2023   Component Date Value   • Case Report 07/31/2023                      Value:Surgical Pathology Report                         Case: [id number]                                   Authorizing Provider:  Mari Faith MD       Collected:           07/31/2023 1050              Ordering Location:     [facility]        Received:            08/01/2023 [address] [place] [address] Endoscopy                                                     Pathologist:           Saintclair Sensing, MD                                                                 Specimens:   A) - Duodenum, duodenal biopsy R/O celiac                                                           B) - Stomach, Gastric R/O H Pylori                                                                  C) - Esophagogastric junction, ge junction R/O Barretts                                             D) - Esophagus, esophagus R/O EOE                                                                   E) - Terminal Ileum, terminal ileum R/O IBD                                                                                   F) - Colon, cecum asending tranverse R/O IBD                                                        G) - Colon, Descending 35-45 R/O IBD H) - Colon, sigmoid polyp cold snare                                                                I) - Colon, sigmoid R/O IBD                                                                         J) - Colon, rectal R/O IBD                                                                • Final Diagnosis 07/31/2023                      Value: This result contains rich text formatting which cannot be displayed here. • Note 07/31/2023                      Value: This result contains rich text formatting which cannot be displayed here. • Additional Information 07/31/2023                      Value: This result contains rich text formatting which cannot be displayed here. • Gross Description 07/31/2023                      Value: This result contains rich text formatting which cannot be displayed here. Radiology Results:   CT abdomen pelvis w contrast    Result Date: 8/7/2023  Narrative: CT ABDOMEN AND PELVIS WITH IV CONTRAST INDICATION:   R10.11: Right upper quadrant pain. Cholecystectomy. COMPARISON: Ultrasound 6/24/2023. CT renal stone study 4/14/2021. TECHNIQUE:  CT examination of the abdomen and pelvis was performed. Multiplanar 2D reformatted images were created from the source data. This examination, like all CT scans performed in the [facility], was performed utilizing techniques to minimize radiation dose exposure, including the use of iterative reconstruction and automated exposure control. Radiation dose length product (DLP) for this visit:  913 mGy-cm IV Contrast:  100 mL of iohexol (OMNIPAQUE) Enteric Contrast:  Enteric contrast was administered. FINDINGS: ABDOMEN LOWER CHEST:  Small hiatal hernia . No other clinically significant abnormality identified in the visualized lower chest. LIVER/BILIARY TREE:  Unremarkable. GALLBLADDER:  Gallbladder is surgically absent. SPLEEN:  Unremarkable. PANCREAS:  Unremarkable.  ADRENAL GLANDS:  Unremarkable. KIDNEYS/URETERS: Asymmetric CT nephrograms. Right kidney is in corticomedullary phase while the left is in the nephrographic phase. No hydroureteronephrosis. No renal or ureteral calculi identified. [place] STOMACH AND BOWEL:  Unremarkable. APPENDIX:  No findings to suggest appendicitis. ABDOMINOPELVIC CAVITY:  No ascites. No pneumoperitoneum. No lymphadenopathy. VESSELS:  Unremarkable for patient's age. Bilateral renal arteries and veins appear patent. PELVIS REPRODUCTIVE ORGANS:  Unremarkable for patient's age. URINARY BLADDER:  Unremarkable. ABDOMINAL WALL/INGUINAL REGIONS:  Unremarkable. OSSEOUS STRUCTURES:  No acute fracture or destructive osseous lesion. Degenerative changes. Impression: Asymmetric CT nephrograms. Right kidney is in corticomedullary phase while the left is in the nephrographic phase. No hydroureteronephrosis. No renal or ureteral calculi identified. Recommend urology consultation. This study demonstrates an immediate finding and was documented as such in [facility] for liaison and referring practitioner notification. Workstation performed: [id number]     Colonoscopy    Result Date: 7/31/2023  Narrative: Table formatting from the original result was not included. [address] [facility] Endoscopy [facility],2 And 3 S Floors [phone] DATE OF SERVICE: 7/31/23 PHYSICIAN(S): Attending: Olga Gaucher, MD Fellow: No Staff Documented INDICATION: Rectal bleeding, Change in bowel habits POST-OP DIAGNOSIS: See the impression below. HISTORY: Prior colonoscopy: No prior colonoscopy. BOWEL PREPARATION: Golytely/Colyte/Trilyte PREPROCEDURE: Informed consent was obtained for the procedure, including sedation. Risks including but not limited to bleeding, infection, perforation, adverse drug reaction and aspiration were explained in detail. Also explained about less than 100% sensitivity with the exam and other alternatives.  The patient was placed in the left lateral decubitus position. Procedure: Colonoscopy DETAILS OF PROCEDURE: Patient was taken to the procedure room where a time out was performed to confirm correct patient and correct procedure. The patient underwent monitored anesthesia care, which was administered by an anesthesia professional. The patient's blood pressure, heart rate, level of consciousness, oxygen, respirations and ECG were monitored throughout the procedure. A digital rectal exam was performed. The scope was introduced through the anus and advanced to the terminal ileum. Retroflexion was performed in the rectum. The quality of bowel preparation was evaluated using the [facility] Bowel Preparation Scale with scores of: right colon = 1, transverse colon = 2, left colon = 2. The total BBPS score was 5. Bowel prep was not adequate. The patient experienced no blood loss. The procedure was not difficult. The patient tolerated the procedure well. There were no apparent adverse events. ANESTHESIA INFORMATION: ASA: II Anesthesia Type: IV Sedation with Anesthesia MEDICATIONS: sodium chloride 0.9 % infusion 1,000 mL*  *From user-documented volume (Totals for administrations occurring from 1043 to 1139 on 07/31/23) FINDINGS: Large amount of adherent semi-liquid stool in the right colon. Not sufficient for CRC screening purposes The terminal ileum appeared normal. Performed random biopsy using biopsy forceps. Mild, patchy edematous mucosa with loss of vascular pattern in the descending colon; performed cold forceps biopsy to rule out IBD.  Area of abnormality was from 35 to 45 cm One 6 mm sessile polyp in the sigmoid colon; completely removed en bloc by cold snare and retrieved specimen Performed pancolonic forceps biopsies to rule out IBD EVENTS: Procedure Events Event Event Time ENDO CECUM REACHED 7/31/2023 11:20 AM ENDO SCOPE OUT TIME 7/31/2023 11:35 AM SPECIMENS: ID Type Source Tests Collected by Time Destination 1 : duodenal biopsy R/O celiac Tissue Small Bowel, NOS TISSUE EXAM Kitty Figueroa MD 7/31/2023 10:50 AM  2 : Gastric R/O H Pylori Tissue Stomach TISSUE EXAM Kitty Figueroa MD 7/31/2023 10:51 AM  3 : ge junction R/O Barretts Tissue Esophagogastric junction TISSUE EXAM Kitty Figueroa MD 7/31/2023 10:57 AM  4 : esophagus R/O EOE Tissue Esophagus TISSUE EXAM Kitty Figueroa MD 7/31/2023 11:01 AM  5 : terminal ileum R/O IBD Tissue Terminal Ileum TISSUE EXAM Kitty Figueroa MD 7/31/2023 11:22 AM  6 : cecum asending tranverse R/O IBD Tissue Colon TISSUE EXAM Kitty Figueroa MD 7/31/2023 11:23 AM  7 : Descending 35-45 R/O IBD Tissue Colon TISSUE EXAM Kitty Figueroa MD 7/31/2023 11:29 AM  8 : sigmoid polyp cold snare Tissue Colon TISSUE EXAM Kitty Figueroa MD 7/31/2023 11:31 AM  9 : sigmoid R/O IBD Tissue Colon TISSUE EXAM Kitty Figueroa MD 7/31/2023 11:34 AM  10 : rectal R/O IBD Tissue Colon TISSUE EXAM Kitty Figueroa MD 7/31/2023 11:35 AM  EQUIPMENT: Colonoscope -[id number]     Impression: The terminal ileum appeared normal. Performed random biopsy using biopsy forceps. Mild edematous mucosa with loss of vascular pattern in the descending colon; performed cold forceps biopsy Subcentimeter polyp in the sigmoid colon was removed with cold snare Performed pancolonic forceps biopsies to rule out IBD RECOMMENDATION:  Repeat colonoscopy in 3 years  Personal history of colon polyps Inadequate bowel preparation   Await pathology results  No evidence of IBD. Kitty Figueroa MD     EGD    Result Date: 7/31/2023  Narrative: Table formatting from the original result was not included. [address] [facility] Endoscopy [address] [facility],2 And 3 S Floors [phone] DATE OF SERVICE: 7/31/23 PHYSICIAN(S): Attending: Kitty Figueora MD Fellow: No Staff Documented INDICATION: History of Magaña's esophagus, Gastroesophageal reflux disease, unspecified whether esophagitis present POST-OP DIAGNOSIS: See the impression below.  PREPROCEDURE: Informed consent was obtained for the procedure, including sedation. Risks of perforation, hemorrhage, adverse drug reaction and aspiration were discussed. The patient was placed in the left lateral decubitus position. Patient was explained about the risks and benefits of the procedure. Risks including but not limited to bleeding, infection, and perforation were explained in detail. Also explained about less than 100% sensitivity with the exam and other alternatives. PROCEDURE: EGD DETAILS OF PROCEDURE: Patient was taken to the procedure room where a time out was performed to confirm correct patient and correct procedure. The patient underwent monitored anesthesia care, which was administered by an anesthesia professional. The patient's blood pressure, heart rate, level of consciousness, respirations and oxygen were monitored throughout the procedure. The scope was advanced to the second part of the duodenum. Retroflexion was performed in the fundus. The patient experienced no blood loss. The procedure was not difficult. The patient tolerated the procedure well. There were no apparent adverse events. ANESTHESIA INFORMATION: ASA: II Anesthesia Type: IV Sedation with Anesthesia MEDICATIONS: No administrations occurring from 1043 to 1104 on 07/31/23 FINDINGS: The upper third of the esophagus and middle third of the esophagus appeared normal. Performed random biopsy using biopsy forceps to rule out eosinophilic esophagitis. 1 cm sliding hiatal hernia (type I hiatal hernia) without Cyndia Ego lesions present - GE junction 40 cm from the incisors, diaphragmatic impression 41 cm from the incisors:  Hill classification: Grade II C0M1 Magaña's esophagus observed with 2 tongues.  The Z-line was 39 cm from the incisors and the top of gastric folds was 40 cm from the incisors; electronic chromoendoscopy (NBI) was used; performed cold forceps biopsy Dilated in the lower third of the esophagus with balloon dilator from 18 mm starting size to 18 mm end size. Dilation caused mucosal tears; post-dilation mucosal tears were superficial Mild, patchy erythematous mucosa in the antrum Performed forceps biopsies in the body of the stomach and antrum to rule out H. pylori The duodenal bulb and 2nd part of the duodenum appeared normal. Performed random biopsy using biopsy forceps to rule out celiac disease. SPECIMENS: ID Type Source Tests Collected by Time Destination 1 : duodenal biopsy R/O celiac Tissue Small Bowel, NOS TISSUE EXAM Rossy Narayan MD 7/31/2023 10:50 AM  2 : Gastric R/O H Pylori Tissue Stomach TISSUE EXAM Rossy Narayan MD 7/31/2023 10:51 AM  3 : ge junction R/O Barretts Tissue Esophagogastric junction TISSUE EXAM Rossy Narayan MD 7/31/2023 10:57 AM  4 : esophagus R/O EOE Tissue Esophagus TISSUE EXAM Rossy Narayan MD 7/31/2023 11:01 AM  5 : terminal ileum R/O IBD Tissue Terminal Ileum TISSUE EXAM Rossy Narayan MD 7/31/2023 11:22 AM  6 : cecum asending tranverse R/O IBD Tissue Colon TISSUE EXAM Rossy Narayan MD 7/31/2023 11:23 AM  7 : Descending 35-45 R/O IBD Tissue Colon TISSUE EXAM Rossy Narayan MD 7/31/2023 11:29 AM  8 : sigmoid polyp cold snare Tissue Colon TISSUE EXAM Rossy Narayan MD 7/31/2023 11:31 AM  9 : sigmoid R/O IBD Tissue Colon TISSUE EXAM Rossy Narayan MD 7/31/2023 11:34 AM  10 : rectal R/O IBD Tissue Colon TISSUE EXAM Rossy Narayan MD 7/31/2023 11:35 AM      Impression: The upper third of the esophagus and middle third of the esophagus appeared normal. Performed random biopsy to rule out eosinophilic esophagitis. 1 cm type I hiatal hernia C0M1 Magaña's esophagus; electronic chromoendoscopy (NBI) was used; performed cold forceps biopsy Dilated in the lower third of the esophagus with balloon dilator to 18 mm end size.  Dilation caused mucosal tears; post-dilation mucosal tears were superficial Mild erythematous mucosa in the antrum Performed forceps biopsies in the body of the stomach and antrum to rule out H. pylori The duodenal bulb and 2nd part of the duodenum appeared normal. Performed random biopsy to rule out celiac disease. RECOMMENDATION:  Await pathology results  Continue omeprazole 40 mg twice daily    MD Siomara Nelson PA-C    **Please note:  Dictation voice to text software may have been used in the creation of this record. Occasional wrong word or “sound alike” substitutions may have occurred due to the inherent limitations of voice recognition software. Read the chart carefully and recognize, using context, where substitutions have occurred. **

## 2023-08-07 NOTE — PATIENT INSTRUCTIONS
For your upper GI symptoms, you did have evidence of inflammation on the upper endoscopy though biopsies did not show Magaña's esophagus. I would continue you on omeprazole twice daily now. You do have famotidine/Pepcid on hand should you have a lot of breakthrough symptoms. Because of the symptom of feeling full and having nausea after eating, there may be a component of delayed gastric emptying. If the symptoms persist I would recommend a gastric emptying study which has been ordered for you. In general we would recommend small frequent meals throughout the day low in saturated fats and difficult to digest raw fibrous foods. For your bowels, I do wonder if you have underlying irritable bowel syndrome and may be a component of bile salt diarrhea. However, there has been a clear change since the colonoscopy and I think it is important to rule out any new infections. Please have the stool studies done as you are able. You can try a dedicated fiber supplement to see if you tolerate this and it will help to add bulk and formed to the stool. If you still have a urgent loose stool, we can try a medication called Questran powder which is very safe and helps to bind of stool in patients who no longer have a gallbladder. If you feel that the cramping is more prevalent, let me know and we can try an anticramping pill called Bentyl. There is also an over-the-counter medicine called IBgard which is concentrated peppermint oil and this tends to help relax the smooth muscle in the GI tract. There is a low FODMAP diet that you can look into to see if this helps with your symptoms. Follow up with PCP about CT results.

## 2023-08-07 NOTE — TELEPHONE ENCOUNTER
Called and spoke with patient regarding CT results as per MONI Owen.     ". ..CT of the Abdomen was normal from a GI standpoint, but did show some kidney abnormalities and a urology consultation is required. It looks like he has a f/u OV with his PCP tomorrow. He should discuss it with them at that [address] [place] [address] and proceed with the treatment plan as we discussed at this last OV."    Patient gave verbal understanding and plans to speak with his PCP regarding these results and a referral to urology tomorrow. Patient is also scheduled for a follow-up with gastroenterology today. Encouraged to keep appointment as scheduled.

## 2023-08-08 ENCOUNTER — OFFICE VISIT (OUTPATIENT)
Dept: FAMILY MEDICINE CLINIC | Facility: CLINIC | Age: 43
End: 2023-08-08
Payer: COMMERCIAL

## 2023-08-08 VITALS
DIASTOLIC BLOOD PRESSURE: 70 MMHG | RESPIRATION RATE: 18 BRPM | HEIGHT: 68 IN | SYSTOLIC BLOOD PRESSURE: 122 MMHG | HEART RATE: 69 BPM | WEIGHT: 213.2 LBS | BODY MASS INDEX: 32.31 KG/M2 | OXYGEN SATURATION: 97 % | TEMPERATURE: 97.7 F

## 2023-08-08 DIAGNOSIS — R10.13 EPIGASTRIC PAIN: ICD-10-CM

## 2023-08-08 DIAGNOSIS — F17.200 TOBACCO DEPENDENCE: ICD-10-CM

## 2023-08-08 DIAGNOSIS — R93.429 ABNORMAL CT SCAN, KIDNEY: ICD-10-CM

## 2023-08-08 DIAGNOSIS — R06.09 DYSPNEA ON EXERTION: Primary | ICD-10-CM

## 2023-08-08 PROBLEM — K22.70 BARRETT'S ESOPHAGUS DETERMINED BY BIOPSY: Status: ACTIVE | Noted: 2023-06-13

## 2023-08-08 PROBLEM — R73.03 PRE-DIABETES: Status: RESOLVED | Noted: 2023-06-13 | Resolved: 2023-08-08

## 2023-08-08 PROCEDURE — 99214 OFFICE O/P EST MOD 30 MIN: CPT | Performed by: INTERNAL MEDICINE

## 2023-08-08 NOTE — PROGRESS NOTES
[facility]        NAME: Belen Cordova is a 43 y.o. male  : 1980    MRN: [id number]  DATE: 2023  TIME: 9:52 AM    Assessment and Plan   1. Dyspnea on exertion  - improved, possibly related to GERD. Discussed cardiac testing if worsens or develops chest pain. Encouraged quitting vaping as well     2. Abnormal CT scan, kidney  -asymmetric nephrograms on recent CT A/P. Pt had normal urinalysis, no proteinuria, normal renal US. Cr 1.1-1.2. Will have him discuss further with Urology     -  Ambulatory Referral to Urology; Future    3. Tobacco dependence  -discussed smoking cessation, including medication options. -    nicotine polacrilex (NICORETTE) 4 mg gum; Chew 1 each (4 mg total) as needed for smoking cessation    4. Epigastric pain             Chief Complaint     Chief Complaint   Patient presents with   • Follow-up         History of Present Illness       35yo male with Magaña's esophagus, GERD here for 8 week follow up. Seeing GI regarding reflux, nausea, early satiety. GES scheduled. States his breathing improved since EGD and taking PPI BID. Still some BENNETT but admits to being sedentary, not physically active. Denies chest pain but does report some elevated HR with minimal exertion, as well as occasional palpitations (usually after vaping nicotine). Trying to quit vaping but lozenges were ineffective. Would like to try gum instead. Tried patches in the past without success. Adverse effects from Chantix (irritability). Review of Systems   Review of Systems   Constitutional: Negative for appetite change, chills, fatigue and fever. HENT: Negative for trouble swallowing. Respiratory: Positive for shortness of breath. Negative for chest tightness. Cardiovascular: Positive for palpitations. Negative for chest pain. Gastrointestinal: Positive for abdominal pain.          Current Medications       Current Outpatient Medications:   •  nicotine polacrilex (NICORETTE) 4 mg gum, Chew 1 each (4 mg total) as needed for smoking cessation, Disp: 100 each, Rfl: 0  •  albuterol (ProAir HFA) 90 mcg/act inhaler, Inhale 2 puffs every 6 (six) hours as needed for wheezing, Disp: 8.5 g, Rfl: 0  •  famotidine (PEPCID) 20 mg tablet, Take 1 PO BID PRN for break through reflux. , Disp: 45 tablet, Rfl: 2  •  naproxen (Naprosyn) 500 mg tablet, Take 1 tablet (500 mg total) by mouth 2 (two) times a day with meals, Disp: 30 tablet, Rfl: 0  •  omeprazole (PriLOSEC) 40 MG capsule, Take 1 PO BID., Disp: 60 capsule, Rfl: 2    Current Allergies     Allergies as of 08/08/2023 - Reviewed 08/07/2023   Allergen Reaction Noted   • Hydrocodone Anaphylaxis 11/23/2018            The following portions of the patient's history were reviewed and updated as appropriate: allergies, current medications, past family history, past medical history, past social history, past surgical history and problem list.     Past Medical History:   Diagnosis Date   • Allergic    • GERD (gastroesophageal reflux disease) 2008    Barretts esophagus   • History of Magaña's esophagus    • Hypoglycemia        Past Surgical History:   Procedure Laterality Date   • CHOLECYSTECTOMY  2014   • EGD     • WRIST SURGERY  1987    bone cyst       Family History   Problem Relation Age of Onset   • Heart disease Mother    • Diabetes Mother    • Kidney disease Mother    • Heart disease Father    • Diabetes Father    • Kidney disease Father          Medications have been verified. Objective   /70   Pulse 69   Temp 97.7 °F (36.5 °C)   Resp 18   Ht 5' 8" (1.727 m)   Wt 96.7 kg (213 lb 3.2 oz)   SpO2 97%   BMI 32.42 kg/m²        Physical Exam     Physical Exam  Vitals reviewed. Constitutional:       General: He is not in acute distress. Appearance: He is obese. Cardiovascular:      Rate and Rhythm: Normal rate and regular rhythm. Heart sounds: No murmur heard. No friction rub. No gallop.    Pulmonary: Effort: Pulmonary effort is normal. No respiratory distress. Breath sounds: Normal breath sounds. No wheezing, rhonchi or rales. Neurological:      General: No focal deficit present. Mental Status: He is alert. Psychiatric:         Mood and Affect: Mood normal.         Behavior: Behavior normal.         Thought Content:  Thought content normal.         Judgment: Judgment normal.             Results:  Lab Results   Component Value Date    SODIUM 140 06/17/2023    K 5.0 06/17/2023     06/17/2023    CO2 29 06/17/2023    BUN 18 06/17/2023    CREATININE 1.28 06/17/2023    GLUC 138 (H) 09/09/2021    CALCIUM 9.3 06/17/2023       Lab Results   Component Value Date    HGBA1C 5.4 06/17/2023       Lab Results   Component Value Date    WBC 5.39 06/17/2023    HGB 15.2 06/17/2023    HCT 44.1 06/17/2023    MCV 86 06/17/2023     06/17/2023

## 2023-09-08 ENCOUNTER — OFFICE VISIT (OUTPATIENT)
Dept: URGENT CARE | Facility: CLINIC | Age: 43
End: 2023-09-08
Payer: COMMERCIAL

## 2023-09-08 VITALS
HEART RATE: 83 BPM | HEIGHT: 68 IN | TEMPERATURE: 98.3 F | RESPIRATION RATE: 16 BRPM | WEIGHT: 213.2 LBS | DIASTOLIC BLOOD PRESSURE: 85 MMHG | BODY MASS INDEX: 32.31 KG/M2 | SYSTOLIC BLOOD PRESSURE: 122 MMHG | OXYGEN SATURATION: 99 %

## 2023-09-08 DIAGNOSIS — H10.31 ACUTE BACTERIAL CONJUNCTIVITIS OF RIGHT EYE: Primary | ICD-10-CM

## 2023-09-08 PROCEDURE — G0382 LEV 3 HOSP TYPE B ED VISIT: HCPCS | Performed by: NURSE PRACTITIONER

## 2023-09-08 PROCEDURE — 99283 EMERGENCY DEPT VISIT LOW MDM: CPT | Performed by: NURSE PRACTITIONER

## 2023-09-08 RX ORDER — TOBRAMYCIN 3 MG/ML
1 SOLUTION/ DROPS OPHTHALMIC
Qty: 5 ML | Refills: 0 | Status: SHIPPED | OUTPATIENT
Start: 2023-09-08

## 2023-09-08 NOTE — PROGRESS NOTES
[facility]        NAME: Piero Martin is a 43 y.o. male  : 1980    MRN: [id number]  DATE: 2023  TIME: 5:10 PM    Assessment and Plan   Acute bacterial conjunctivitis of right eye [H10.31]  1. Acute bacterial conjunctivitis of right eye  tobramycin (TOBREX) 0.3 % SOLN            Patient Instructions     Patient Instructions   Use drops as directed. Frequent hand washing. Clean pillow case daily. Follow up with PCP in 3-5 days. Proceed to the ER if symptoms worsen. Chief Complaint     Chief Complaint   Patient presents with   • Conjunctivitis     Right eye itches, burns, watery, crusty started yesterday          History of Present Illness       Conjunctivitis   The current episode started yesterday. The onset was gradual. The problem occurs frequently. The problem has been gradually worsening. The problem is mild. Nothing relieves the symptoms. Nothing aggravates the symptoms. Associated symptoms include eye itching, eye discharge and eye redness. Pertinent negatives include no fever, no decreased vision, no photophobia, no congestion, no ear discharge, no ear pain, no headaches, no hearing loss, no rhinorrhea, no sore throat, no swollen glands and no eye pain. The right eye is affected. The eye pain is associated with movement. The eyelid exhibits no abnormality. Review of Systems   Review of Systems   Constitutional: Negative for chills, diaphoresis, fatigue and fever. HENT: Negative for congestion, ear discharge, ear pain, hearing loss, rhinorrhea and sore throat. Eyes: Positive for discharge, redness and itching. Negative for photophobia, pain and visual disturbance. Respiratory: Negative. Cardiovascular: Negative. Musculoskeletal: Negative. Neurological: Negative for weakness and headaches.          Current Medications       Current Outpatient Medications:   •  albuterol (ProAir HFA) 90 mcg/act inhaler, Inhale 2 puffs every 6 (six) hours as needed for wheezing, Disp: 8.5 g, Rfl: 0  •  famotidine (PEPCID) 20 mg tablet, Take 1 PO BID PRN for break through reflux. , Disp: 45 tablet, Rfl: 2  •  naproxen (Naprosyn) 500 mg tablet, Take 1 tablet (500 mg total) by mouth 2 (two) times a day with meals, Disp: 30 tablet, Rfl: 0  •  nicotine polacrilex (NICORETTE) 4 mg gum, Chew 1 each (4 mg total) as needed for smoking cessation, Disp: 100 each, Rfl: 0  •  omeprazole (PriLOSEC) 40 MG capsule, Take 1 PO BID., Disp: 60 capsule, Rfl: 2  •  tobramycin (TOBREX) 0.3 % SOLN, Administer 1 drop to the right eye every 4 (four) hours while awake, Disp: 5 mL, Rfl: 0    Current Allergies     Allergies as of 09/08/2023 - Reviewed 09/08/2023   Allergen Reaction Noted   • Hydrocodone Anaphylaxis 11/23/2018            The following portions of the patient's history were reviewed and updated as appropriate: allergies, current medications, past family history, past medical history, past social history, past surgical history and problem list.     Past Medical History:   Diagnosis Date   • Allergic    • GERD (gastroesophageal reflux disease) 2008    Barretts esophagus   • History of Magaña's esophagus    • Hypoglycemia        Past Surgical History:   Procedure Laterality Date   • CHOLECYSTECTOMY  2014   • EGD     • WRIST SURGERY  1987    bone cyst       Family History   Problem Relation Age of Onset   • Heart disease Mother    • Diabetes Mother    • Kidney disease Mother    • Heart disease Father    • Diabetes Father    • Kidney disease Father          Medications have been verified. Objective   /85   Pulse 83   Temp 98.3 °F (36.8 °C)   Resp 16   Ht 5' 8" (1.727 m)   Wt 96.7 kg (213 lb 3.2 oz)   SpO2 99%   BMI 32.42 kg/m²   No LMP for male patient. Physical Exam     Physical Exam  Constitutional:       General: He is not in acute distress. Appearance: Normal appearance. He is not diaphoretic. HENT:      Head: Normocephalic and atraumatic.    Eyes: General: Lids are normal.      Extraocular Movements: Extraocular movements intact. Conjunctiva/sclera:      Right eye: Right conjunctiva is injected. No exudate. Pupils: Pupils are equal, round, and reactive to light. Cardiovascular:      Rate and Rhythm: Normal rate and regular rhythm. Heart sounds: Normal heart sounds, S1 normal and S2 normal.   Pulmonary:      Effort: Pulmonary effort is normal.      Breath sounds: Normal breath sounds and air entry. Skin:     General: Skin is warm and dry. Capillary Refill: Capillary refill takes less than 2 seconds. Neurological:      Mental Status: He is alert.

## 2023-09-08 NOTE — PATIENT INSTRUCTIONS
Use drops as directed. Frequent hand washing. Clean pillow case daily. Follow up with PCP in 3-5 days. Proceed to the ER if symptoms worsen.

## 2023-09-18 ENCOUNTER — HOSPITAL ENCOUNTER (OUTPATIENT)
Dept: NUCLEAR MEDICINE | Facility: HOSPITAL | Age: 43
Discharge: HOME | End: 2023-09-18

## 2023-09-18 DIAGNOSIS — R11.0 NAUSEA: ICD-10-CM

## 2023-10-05 ENCOUNTER — HOSPITAL ENCOUNTER (OUTPATIENT)
Dept: NUCLEAR MEDICINE | Facility: HOSPITAL | Age: 43
Discharge: HOME | End: 2023-10-05
Payer: COMMERCIAL

## 2023-10-05 PROCEDURE — 78264 GASTRIC EMPTYING IMG STUDY: CPT

## 2023-10-05 PROCEDURE — A9541 TC99M SULFUR COLLOID: HCPCS

## 2023-10-05 PROCEDURE — G1004 CDSM NDSC: HCPCS

## 2023-12-19 ENCOUNTER — OFFICE VISIT (OUTPATIENT)
Dept: GASTROENTEROLOGY | Facility: CLINIC | Age: 43
End: 2023-12-19
Payer: COMMERCIAL

## 2023-12-19 VITALS
TEMPERATURE: 97.5 F | DIASTOLIC BLOOD PRESSURE: 72 MMHG | OXYGEN SATURATION: 100 % | RESPIRATION RATE: 16 BRPM | WEIGHT: 225.6 LBS | BODY MASS INDEX: 34.19 KG/M2 | HEART RATE: 97 BPM | SYSTOLIC BLOOD PRESSURE: 132 MMHG | HEIGHT: 68 IN

## 2023-12-19 DIAGNOSIS — Z87.19 HISTORY OF BARRETT'S ESOPHAGUS: ICD-10-CM

## 2023-12-19 DIAGNOSIS — K21.9 GASTROESOPHAGEAL REFLUX DISEASE, UNSPECIFIED WHETHER ESOPHAGITIS PRESENT: Primary | ICD-10-CM

## 2023-12-19 DIAGNOSIS — R19.4 CHANGE IN BOWEL HABITS: ICD-10-CM

## 2023-12-19 DIAGNOSIS — R13.10 DYSPHAGIA, UNSPECIFIED TYPE: ICD-10-CM

## 2023-12-19 DIAGNOSIS — Z86.010 PERSONAL HISTORY OF COLONIC POLYPS: ICD-10-CM

## 2023-12-19 DIAGNOSIS — R11.0 NAUSEA: ICD-10-CM

## 2023-12-19 PROCEDURE — 99214 OFFICE O/P EST MOD 30 MIN: CPT | Performed by: PHYSICIAN ASSISTANT

## 2023-12-19 RX ORDER — OMEPRAZOLE 40 MG/1
40 CAPSULE, DELAYED RELEASE ORAL 2 TIMES DAILY
Qty: 60 CAPSULE | Refills: 5 | Status: SHIPPED | OUTPATIENT
Start: 2023-12-19

## 2023-12-19 NOTE — PATIENT INSTRUCTIONS
Go back on omeprazole at least once daily.   You can increase to twice daily if you remember the dose.   Keep working on cutting out smoking.     For your bowels, you can use a fiber supplement if you need.   No alarm features though if you have worsening diarrhea please have stool studies completed.

## 2023-12-19 NOTE — PROGRESS NOTES
[facility] - Outpatient Follow-up Note  Flaquito Moore 42 y.o. male MRN: [id number]  Encounter: [id number]    ASSESSMENT AND PLAN:      1. Gastroesophageal reflux disease, unspecified whether esophagitis present  2. Dysphagia, unspecified type  3. History of Magaña's esophagus  4. Nausea    Patient with history of heartburn and documented history of Magaña's esophagus, though biopsies from EGD in 07/2023 demonstrated salmon-colored mucosa and no evidence of intestinal metaplasia. GES was wnl.     He unfortunately has been noncompliant with medication and is once again experiencing heartburn and intermittent dysphagia to solids.  I urged him to restart PPI at least once daily now.  Encouraged him to place on bedside table, setting alarm first thing in the morning to take.  He can also  second dose later in the day if he remembers.  He is currently on famotidine at all and we will defer restarting this at present unless he has treatment refractory symptoms.  Continue with diet and lifestyle modifications for GERD as well as dysphagia precautions.    Should dysphagia persist, would need to consider earlier repeat EGD with repeat dilation.   Otherwise, as previously discussed, we will consider a repeat endoscopy for surveillance purposes at the time of his next colonoscopy to take additional biopsies and increase his sample size, though if biopsies are repeatedly negative for Magaña's we could consider discontinuing surveillance.    - omeprazole (PriLOSEC) 40 MG capsule; Take 1 capsule (40 mg total) by mouth 2 (two) times a day Take 1 PO BID.  Dispense: 60 capsule; Refill: 5    5. Personal history of colonic polyps  6. Change in bowel habits    Noted, patient is due for repeat colonoscopy for surveillance purposes in 07/2026 given adenoma removed during his colonoscopy inadequate bowel preparation.  He did have some area of patchy edematous mucosa and loss of vascular pattern in the  descending colon, though biopsies were unremarkable for any active or chronic colitis.    His bowel habits have improved without intervention.   He is no longer having urgent loose stools as previously endorsed.   No alarm features to GI tract, no sig straining, diarrhea, pain, blood, nocturnal Bms or weight loss.   Continue to monitor at this time, may have some degree of IBS.   If needed, we can investigate further with stool studies which were previously ordered.     We will follow up in 6-8 weeks to reassess his dysphagia.  ______________________________________________________________________    SUBJECTIVE: Patient is a 42 y.o. male who presents today for follow-up regarding nausea. Pmhx sig for Magaña's esophagus, GERD, tobacco abuse, hx of etoh abuse.  History of cholecystectomy.     Pt was last evaluated in 08/2023. At that time, he was complaining of some post-prandial nausea. He had GES completed which showed normal gastric emptying. He was also complaining of diarrhea, intermittent BRBPR. Colonoscopy demonstrated some erythematous mucosa in descending colon. Bx were negative. Stool studies were ordered though not completed. It was recommended pt trial fiber supplement. Recommended he trail anti-spasmodic though he politely declined at that time.     12/19/23:     Patient shares he has been forgetting to take his omeprazole.  He  well-developed heartburn during the day and then sometimes remember to take a dose in the evening.  He does not take the famotidine at all.  He is starting to once again experience intermittent dysphagia to solids, though nothing as severe as previously noted.  No issues with liquids or pills.  No nausea or vomiting.  No early satiety.    Pertaining to his bowels, patient is no longer having diarrhea.  He has 2-3 soft to formed brown stools daily. He denies chronic straining or constipation, had very rare constipation and straining during which time he saw a scant amt of red on  the wipe only. No watery diarrhea. No steatorrhea. No large volume hematochezia or melena.     Pt has been working on his diet, when he is able to afford it, he is purchasing healthy options, less fried and quick ready foods. Despite this, is gaining weight.    NSAIDs: off and on as needed   Tobacco: vapes nicotine    Cannabis: former  Etoh: none      10/2023: GES: T  1/2 100.5 mins   08/2023: CT A/P: Asymmetric CT nephrograms. Right kidney is in corticomedullary phase while the left is in the nephrographic phase. No hydroureteronephrosis. No renal or ureteral calculi identified. Recommend urology consultation  06/2023: RUQ US: No acute sonographic abnormality to account for the patient's symptoms  06/2023: Hb 15.2, Hct 44.1, MCV 86, Plt 180, BUN 18, Cr 1.28, AST 18, ALT 30, ALP 56, albumin 4.4, t bili 0.54, TSH 1.782      Endoscopic history:   EGD: 07/2023: 1 cm type I hiatal hernia;  C0M1 Magaña's esophagus; Dilated in the lower third of the esophagus with balloon dilator to 18 mm end size. Dilation caused mucosal tears; Mild erythematous mucosa in the antrum  A. Duodenum, duodenal biopsy R/O celiac: Benign duodenal mucosa with focal gastric foveolar metaplasia, suggestive of peptic injury. No intraepithelial lymphocytosis and no villous blunting. No epithelial dysplasia and no evidence of malignancy.  B. Stomach, Gastric R/O H Pylori: Gastric antral and oxyntic mucosa with chronic, inactive gastritis. Separate fragment of mixed gastric and small bowel mucosa, favor antral- duodenal transitional zone mucosa, correlation with endoscopic findings is suggested. Negative for dysplasia or carcinoma. No Helicobacter pylori is identified on H&E stained slides.  C. Esophagogastric junction, ge junction R/O Barretts: Esophageal and adjacent cardiac mucosa with no pathologic abnormality. Negative for goblet cell intestinal metaplasia. No epithelial dysplasia and no evidence of malignancy.  D. Esophagus, esophagus R/O EOE:  Esophageal squamous mucosa with no significant pathologic alteration. No increase in intraepithelial eosinophils is seen  Colon: 07/2023: Large amount of adherent semi-liquid stool in the right colon; Mild, patchy edematous mucosa with loss of vascular pattern in the descending colon; One 6 mm sessile polyp in the sigmoid colon  E. Terminal Ileum, terminal ileum R/O IBD: Small intestinal mucosa with no pathologic abnormality.  F. Colon, cecum asending tranverse R/O IBD: Colonic mucosa with no pathologic abnormality.  No active or chronic colitis is seen. Negative for dysplasia.  G. Colon, Descending 35-45 R/O IBD: Colonic mucosa with reactive changes.  No active or chronic colitis is seen. Negative for dysplasia.  H. Colon, sigmoid polyp cold snare: Tubular adenoma. No high grade dysplasia and no evidence of malignancy.  I. Colon, sigmoid R/O IBD: Colonic mucosa with no pathologic abnormality. No active or chronic colitis is seen. Negative for dysplasia.   J. Colon, rectal R/O IBD: Colonic mucosa with no pathologic abnormality.  No active or chronic colitis is seen. Negative for dysplasia    Review of Systems   Constitutional:  Positive for fever.   Gastrointestinal:  Positive for abdominal pain, diarrhea and nausea. Negative for constipation and vomiting.   Genitourinary:  Positive for frequency. Negative for dysuria and hematuria.   Musculoskeletal:  Negative for arthralgias and myalgias.   Neurological:  Negative for headaches.   Otherwise Per HPI    Historical Information   Past Medical History:   Diagnosis Date    Allergic     GERD (gastroesophageal reflux disease) 2008    Barretts esophagus    History of Magaña's esophagus     Hypoglycemia      Past Surgical History:   Procedure Laterality Date    CHOLECYSTECTOMY  2014    EGD      WRIST SURGERY  1987    bone cyst     Social History   Social History     Substance and Sexual Activity   Alcohol Use Not Currently    Comment: quit drinking about 1.5     Social  "History     Substance and Sexual Activity   Drug Use Not Currently    Comment: former substanec use, sober 10 yrs     Social History     Tobacco Use   Smoking Status Former    Current packs/day: 1.50    Average packs/day: 1.5 packs/day for 24.0 years (36.0 ttl pk-yrs)    Types: Cigarettes   Smokeless Tobacco Never     Family History   Problem Relation Age of Onset    Heart disease Mother     Diabetes Mother     Kidney disease Mother     Heart disease Father     Diabetes Father     Kidney disease Father      Meds/Allergies       Current Outpatient Medications:     albuterol (ProAir HFA) 90 mcg/act inhaler    omeprazole (PriLOSEC) 40 MG capsule    famotidine (PEPCID) 20 mg tablet    naproxen (Naprosyn) 500 mg tablet    nicotine polacrilex (NICORETTE) 4 mg gum    Allergies   Allergen Reactions    Hydrocodone Anaphylaxis     Tolerates percocet, dilaudid     Objective     Blood pressure 132/72, pulse 97, temperature 97.5 °F (36.4 °C), temperature source Temporal, resp. rate 16, height 5' 8\" (1.727 m), weight 102 kg (225 lb 9.6 oz), SpO2 100%. Body mass index is 34.3 kg/m².    Physical Exam  Vitals and nursing note reviewed.   Constitutional:       Appearance: Normal appearance.   HENT:      Head: Normocephalic and atraumatic.   Eyes:      General: No scleral icterus.     Conjunctiva/sclera: Conjunctivae normal.   Cardiovascular:      Rate and Rhythm: Normal rate.   Pulmonary:      Effort: No respiratory distress.   Abdominal:      General: Bowel sounds are normal. There is no distension.      Palpations: Abdomen is soft.      Tenderness: There is no abdominal tenderness. There is no guarding or rebound.   Musculoskeletal:      Right lower leg: No edema.      Left lower leg: No edema.   Skin:     General: Skin is warm and dry.      Coloration: Skin is not jaundiced.   Neurological:      General: No focal deficit present.      Mental Status: He is alert and oriented to person, place, and time.   Psychiatric:         Mood " and Affect: Mood normal.         Behavior: Behavior normal.       Lab Results:   No visits with results within 1 Day(s) from this visit.   Latest known visit with results is:   Hospital Outpatient Visit on 07/31/2023   Component Date Value    Case Report 07/31/2023                      Value:Surgical Pathology Report                         Case: [id number]                                   Authorizing Provider:  Pineda Bernal MD       Collected:           07/31/2023 1050              Ordering Location:     [facility]        Received:            08/01/2023 [address] [place] [address] Endoscopy                                                     Pathologist:           Aries Bustillo MD                                                                 Specimens:   A) - Duodenum, duodenal biopsy R/O celiac                                                           B) - Stomach, Gastric R/O H Pylori                                                                  C) - Esophagogastric junction, ge junction R/O Barretts                                             D) - Esophagus, esophagus R/O EOE                                                                   E) - Terminal Ileum, terminal ileum R/O IBD                                                                                   F) - Colon, cecum asending tranverse R/O IBD                                                        G) - Colon, Descending 35-45 R/O IBD                                                                H) - Colon, sigmoid polyp cold snare                                                                I) - Colon, sigmoid R/O IBD                                                                         J) - Colon, rectal R/O IBD                                                                 Final Diagnosis 07/31/2023                      Value:This result contains rich text formatting which cannot be displayed  here.    Note 07/31/2023                      Value:This result contains rich text formatting which cannot be displayed here.    Additional Information 07/31/2023                      Value:This result contains rich text formatting which cannot be displayed here.    Gross Description 07/31/2023                      Value:This result contains rich text formatting which cannot be displayed here.     Radiology Results:   No results found.    Alma Herrera PA-C    **Please note:  Dictation voice to text software may have been used in the creation of this record.  Occasional wrong word or “sound alike” substitutions may have occurred due to the inherent limitations of voice recognition software.  Read the chart carefully and recognize, using context, where substitutions have occurred.**

## 2023-12-22 ENCOUNTER — OFFICE VISIT (OUTPATIENT)
Dept: URGENT CARE | Facility: CLINIC | Age: 43
End: 2023-12-22
Payer: COMMERCIAL

## 2023-12-22 VITALS
WEIGHT: 219 LBS | HEART RATE: 99 BPM | TEMPERATURE: 98.3 F | DIASTOLIC BLOOD PRESSURE: 76 MMHG | BODY MASS INDEX: 33.19 KG/M2 | OXYGEN SATURATION: 98 % | RESPIRATION RATE: 18 BRPM | SYSTOLIC BLOOD PRESSURE: 130 MMHG | HEIGHT: 68 IN

## 2023-12-22 DIAGNOSIS — B34.9 VIRAL SYNDROME: ICD-10-CM

## 2023-12-22 DIAGNOSIS — R05.1 ACUTE COUGH: Primary | ICD-10-CM

## 2023-12-22 PROCEDURE — G0383 LEV 4 HOSP TYPE B ED VISIT: HCPCS | Performed by: PHYSICIAN ASSISTANT

## 2023-12-22 PROCEDURE — G0382 LEV 3 HOSP TYPE B ED VISIT: HCPCS | Performed by: FAMILY MEDICINE

## 2023-12-22 PROCEDURE — 99284 EMERGENCY DEPT VISIT MOD MDM: CPT | Performed by: PHYSICIAN ASSISTANT

## 2023-12-22 PROCEDURE — 87636 SARSCOV2 & INF A&B AMP PRB: CPT | Performed by: PHYSICIAN ASSISTANT

## 2023-12-22 PROCEDURE — 99283 EMERGENCY DEPT VISIT LOW MDM: CPT | Performed by: FAMILY MEDICINE

## 2023-12-22 NOTE — PROGRESS NOTES
"  [facility]        NAME: Flaquito Moore is a 42 y.o. male  : 1980    MRN: [id number]  DATE: 2023  TIME: 7:04 PM    Assessment and Plan   Acute cough [R05.1]  1. Acute cough  Covid/Flu-Office Collect      2. Viral syndrome          COVID/flu swab pending    Patient Instructions     COVID/flu swab collected today, test results pending.  Test results are available between 24 and 48 hours.  Your results will come through MyChart. Check MyChart and call if needed for test results.  Quarantine guidelines discussed. OTC supplements/medications discussed.  Follow-up with PCP in the next 1-2 days for re-evaluation.  Go to the ED if any fevers, unable to stay hydrated, abdominal pain, chest pain, shortness of breath, wheezing, chest tightness, headaches, dizziness, weakness, new or worsening symptoms or other concerning symptoms.      Chief Complaint     Chief Complaint   Patient presents with    Cold Like Symptoms     Started yesterday. Chills and fever. Took one naproxen this morning.          History of Present Illness       42-year-old male presents for evaluation of \"flulike symptoms\" x 2 days.  States been having runny nose, nasal congestion, fevers where he felt hot/cold with chills and mild generalized bodyaches.  Also notes a mild intermittent dry cough.  States he did take naproxen this morning with significant improvement.  He denies any recent travel or known sick contacts.  Eating and drinking normally, staying hydrated.  He denies any sore throat, dizziness, lightheadedness, chest pain, chest tightness, shortness of breath, wheezing, GI/ symptoms or other complaints.         Review of Systems   Review of Systems   Constitutional:  Positive for chills and fever. Negative for fatigue.   HENT:  Positive for congestion and rhinorrhea. Negative for ear pain, postnasal drip, sinus pressure, sore throat, trouble swallowing and voice change.    Eyes:  Negative for itching and visual " disturbance.   Respiratory:  Positive for cough. Negative for chest tightness, shortness of breath and wheezing.    Cardiovascular:  Negative for chest pain and leg swelling.   Gastrointestinal:  Negative for abdominal pain, diarrhea, nausea and vomiting.   Genitourinary:  Negative for decreased urine volume.   Musculoskeletal:  Positive for myalgias (mild generalized bodyaches). Negative for back pain, joint swelling, neck pain and neck stiffness.   Skin:  Negative for rash.   Neurological:  Negative for dizziness, seizures, syncope, weakness, light-headedness, numbness and headaches.   All other systems reviewed and are negative.        Current Medications       Current Outpatient Medications:     albuterol (ProAir HFA) 90 mcg/act inhaler, Inhale 2 puffs every 6 (six) hours as needed for wheezing, Disp: 8.5 g, Rfl: 0    naproxen (Naprosyn) 500 mg tablet, Take 1 tablet (500 mg total) by mouth 2 (two) times a day with meals, Disp: 30 tablet, Rfl: 0    famotidine (PEPCID) 20 mg tablet, Take 1 PO BID PRN for break through reflux. (Patient not taking: Reported on 12/19/2023), Disp: 45 tablet, Rfl: 2    nicotine polacrilex (NICORETTE) 4 mg gum, Chew 1 each (4 mg total) as needed for smoking cessation (Patient not taking: Reported on 12/19/2023), Disp: 100 each, Rfl: 0    omeprazole (PriLOSEC) 40 MG capsule, Take 1 capsule (40 mg total) by mouth 2 (two) times a day Take 1 PO BID. (Patient not taking: Reported on 12/22/2023), Disp: 60 capsule, Rfl: 5    Current Allergies     Allergies as of 12/22/2023 - Reviewed 12/22/2023   Allergen Reaction Noted    Hydrocodone Anaphylaxis 11/23/2018            The following portions of the patient's history were reviewed and updated as appropriate: allergies, current medications, past family history, past medical history, past social history, past surgical history and problem list.     Past Medical History:   Diagnosis Date    Allergic     GERD (gastroesophageal reflux disease) 2008     "Barretts esophagus    History of Magaña's esophagus     Hypoglycemia        Past Surgical History:   Procedure Laterality Date    CHOLECYSTECTOMY  2014    EGD      WRIST SURGERY  1987    bone cyst       Family History   Problem Relation Age of Onset    Heart disease Mother     Diabetes Mother     Kidney disease Mother     Heart disease Father     Diabetes Father     Kidney disease Father          Medications have been verified.        Objective   /76   Pulse 99   Temp 98.3 °F (36.8 °C)   Resp 18   Ht 5' 8\" (1.727 m)   Wt 99.3 kg (219 lb)   SpO2 98%   BMI 33.30 kg/m²        Physical Exam     Physical Exam  Vitals and nursing note reviewed.   Constitutional:       General: He is not in acute distress.     Appearance: Normal appearance. He is well-developed. He is not ill-appearing or toxic-appearing.   HENT:      Right Ear: Tympanic membrane normal.      Left Ear: Tympanic membrane normal.      Mouth/Throat:      Mouth: Mucous membranes are moist.      Pharynx: Oropharynx is clear. Uvula midline. No oropharyngeal exudate, posterior oropharyngeal erythema or uvula swelling.   Eyes:      Pupils: Pupils are equal, round, and reactive to light.   Cardiovascular:      Rate and Rhythm: Normal rate and regular rhythm.      Heart sounds: Normal heart sounds.   Pulmonary:      Effort: Pulmonary effort is normal. No respiratory distress.      Breath sounds: Normal breath sounds. No wheezing.   Musculoskeletal:      Cervical back: Normal range of motion and neck supple. No rigidity.   Skin:     Capillary Refill: Capillary refill takes less than 2 seconds.   Neurological:      Mental Status: He is alert and oriented to person, place, and time.   Psychiatric:         Behavior: Behavior normal.                     "

## 2023-12-23 LAB
FLUAV RNA RESP QL NAA+PROBE: NEGATIVE
FLUBV RNA RESP QL NAA+PROBE: NEGATIVE
SARS-COV-2 RNA RESP QL NAA+PROBE: POSITIVE

## 2024-02-22 ENCOUNTER — OFFICE VISIT (OUTPATIENT)
Dept: GASTROENTEROLOGY | Facility: CLINIC | Age: 44
End: 2024-02-22

## 2024-02-22 VITALS
TEMPERATURE: 97.5 F | SYSTOLIC BLOOD PRESSURE: 120 MMHG | HEIGHT: 68 IN | WEIGHT: 216.2 LBS | RESPIRATION RATE: 16 BRPM | HEART RATE: 81 BPM | BODY MASS INDEX: 32.77 KG/M2 | OXYGEN SATURATION: 98 % | DIASTOLIC BLOOD PRESSURE: 72 MMHG

## 2024-02-22 DIAGNOSIS — R13.10 DYSPHAGIA, UNSPECIFIED TYPE: Primary | ICD-10-CM

## 2024-02-22 DIAGNOSIS — Z86.010 PERSONAL HISTORY OF COLONIC POLYPS: ICD-10-CM

## 2024-02-22 DIAGNOSIS — K21.00 GASTROESOPHAGEAL REFLUX DISEASE WITH ESOPHAGITIS WITHOUT HEMORRHAGE: ICD-10-CM

## 2024-02-22 DIAGNOSIS — Z87.19 HISTORY OF BARRETT'S ESOPHAGUS: ICD-10-CM

## 2024-02-22 PROCEDURE — 99213 OFFICE O/P EST LOW 20 MIN: CPT | Performed by: PHYSICIAN ASSISTANT

## 2024-02-22 NOTE — PATIENT INSTRUCTIONS
Continue to work on getting at least one dose daily of omeprazole into your regimen.   Continue with small frequent meals, avoid triggers for GERD.     Continue with high fiber diet and excellent hydration.

## 2024-02-22 NOTE — PROGRESS NOTES
[facility] - Outpatient Follow-up Note  Flaquito Moore 43 y.o. male MRN: [id number]  Encounter: [id number]    ASSESSMENT AND PLAN:      1. Dysphagia, unspecified type  2. Gastroesophageal reflux disease with esophagitis without hemorrhage  3. History of Magaña's esophagus    Pt with hx of heartburn, reported hx of Magaña's esophagus, though bx from EGD in 07/2023 were negative for intestinal metaplasia.  No evidence of H. pylori infection.  He has had difficulty with compliance of PPI, and was off completely for 1.5 months with return of heartburn and intermittent dysphagia.  He is now taking medication about 50% of the week and has noted improvement.  I encouraged him to work on medication compliance, even if he forgets his dose in the morning he can take when he remembers.    Continue with small frequent meals and diet and lifestyle modifications for GERD.  Given marked improvement in dysphagia, we will monitor conservatively.  We did review however should the symptoms progress we would plan for a repeat endoscopy.  Otherwise, we will consider repeat EGD for surveillance purposes at the time of his next colonoscopy to take additional biopsies and increase his sample size, though if biopsies repeatedly negative for Magaña's would consider discontinuing surveillance.    4. Personal history of colonic polyps    Noted, patient due for repeat colonoscopy for surveillance purposes in 07/2026 given adenoma removed on colon in 2023 and inadequate bowel preparation.  He did have some areas of patchy edematous mucosa and loss of vascular pattern in the descending colon though biopsies were unremarkable.    Continue with a high-fiber diet and excellent hydration, as well as regular physical activity.  No alarm features to the lower GI tract that would warrant earlier investigation at this time.    Follow-up in 6 months to reassess  symptoms.  ______________________________________________________________________    SUBJECTIVE: Patient is a 43 y.o. male who presents today for follow-up regarding nausea. Pmhx sig for Magaña's esophagus, GERD, tobacco abuse, hx of etoh abuse.  History of cholecystectomy.     Pt was last evaluated in clinic in 12/2023. At that time, he was having recurrence of intermittent solid food dysphagia, though had also noted forgetting to take acid blocking medications. It was recommended he restart PPI at that time.     02/22/24:     Patient shares he has been more compliant with his omeprazole, he is remembering to take his medication about 3 days/week, as opposed to not at all.  He is also working on healthy diet and lifestyle.  He lost 10 pounds since last office visit.  Eating less fried and processed foods.  He notes his heartburn is much better controlled, though he will still intermittently have breakthrough acid reflux type symptoms.  The dysphagia is rare at this time, notes intermittent dysphagia to certain dry solids, no issues with pills or liquids.  No nausea, emesis, early satiety.    Patient shares that he is having 1 formed brown bowel movement daily.  He was eating cheeses regularly and feels that this blocked him up, had 1 episode of constipation since last office visit.  No BRBPR or melena.  No nocturnal BMs.    NSAIDs: off and on as needed   Tobacco: vapes nicotine    Cannabis: former  Etoh: none     10/2023: GES: T 1/2 100.5 mins   08/2023: CT A/P: Asymmetric CT nephrograms. Right kidney is in corticomedullary phase while the left is in the nephrographic phase. No hydroureteronephrosis. No renal or ureteral calculi identified. Recommend urology consultation  06/2023: RUQ US: No acute sonographic abnormality to account for the patient's symptoms  06/2023: Hb 15.2, Hct 44.1, MCV 86, Plt 180, BUN 18, Cr 1.28, AST 18, ALT 30, ALP 56, albumin 4.4, t bili 0.54, TSH 1.782      Endoscopic history:   EGD:  07/2023: 1 cm type I hiatal hernia;  C0M1 Magaña's esophagus; Dilated in the lower third of the esophagus with balloon dilator to 18 mm end size. Dilation caused mucosal tears; Mild erythematous mucosa in the antrum  A. Duodenum, duodenal biopsy R/O celiac: Benign duodenal mucosa with focal gastric foveolar metaplasia, suggestive of peptic injury. No intraepithelial lymphocytosis and no villous blunting. No epithelial dysplasia and no evidence of malignancy.  B. Stomach, Gastric R/O H Pylori: Gastric antral and oxyntic mucosa with chronic, inactive gastritis. Separate fragment of mixed gastric and small bowel mucosa, favor antral- duodenal transitional zone mucosa, correlation with endoscopic findings is suggested. Negative for dysplasia or carcinoma. No Helicobacter pylori is identified on H&E stained slides.  C. Esophagogastric junction, ge junction R/O Barretts: Esophageal and adjacent cardiac mucosa with no pathologic abnormality. Negative for goblet cell intestinal metaplasia. No epithelial dysplasia and no evidence of malignancy.  D. Esophagus, esophagus R/O EOE: Esophageal squamous mucosa with no significant pathologic alteration. No increase in intraepithelial eosinophils is seen  Colon: 07/2023: Large amount of adherent semi-liquid stool in the right colon; Mild, patchy edematous mucosa with loss of vascular pattern in the descending colon; One 6 mm sessile polyp in the sigmoid colon  E. Terminal Ileum, terminal ileum R/O IBD: Small intestinal mucosa with no pathologic abnormality.  F. Colon, cecum asending tranverse R/O IBD: Colonic mucosa with no pathologic abnormality.  No active or chronic colitis is seen. Negative for dysplasia.  G. Colon, Descending 35-45 R/O IBD: Colonic mucosa with reactive changes.  No active or chronic colitis is seen. Negative for dysplasia.  H. Colon, sigmoid polyp cold snare: Tubular adenoma. No high grade dysplasia and no evidence of malignancy.  I. Colon, sigmoid R/O IBD:  Colonic mucosa with no pathologic abnormality. No active or chronic colitis is seen. Negative for dysplasia.   J. Colon, rectal R/O IBD: Colonic mucosa with no pathologic abnormality.  No active or chronic colitis is seen. Negative for dysplasia    Review of Systems   Constitutional:  Negative for fever.   Gastrointestinal:  Positive for abdominal pain and constipation. Negative for diarrhea, nausea and vomiting.   Genitourinary:  Negative for dysuria, frequency and hematuria.   Musculoskeletal:  Negative for arthralgias and myalgias.   Neurological:  Negative for headaches.   Otherwise Per HPI    Historical Information   Past Medical History:   Diagnosis Date    Allergic     GERD (gastroesophageal reflux disease) 2008    Barretts esophagus    History of Magaña's esophagus     Hypoglycemia      Past Surgical History:   Procedure Laterality Date    CHOLECYSTECTOMY  2014    EGD      WRIST SURGERY  1987    bone cyst     Social History   Social History     Substance and Sexual Activity   Alcohol Use Not Currently    Comment: quit drinking about 1.5     Social History     Substance and Sexual Activity   Drug Use Not Currently    Comment: former substanec use, sober 10 yrs     Social History     Tobacco Use   Smoking Status Every Day    Current packs/day: 1.50    Average packs/day: 1.5 packs/day for 24.0 years (36.0 ttl pk-yrs)    Types: Cigarettes, E-Cigarettes   Smokeless Tobacco Never     Family History   Problem Relation Age of Onset    Heart disease Mother     Diabetes Mother     Kidney disease Mother     Heart disease Father     Diabetes Father     Kidney disease Father      Meds/Allergies       Current Outpatient Medications:     albuterol (ProAir HFA) 90 mcg/act inhaler    naproxen (Naprosyn) 500 mg tablet    omeprazole (PriLOSEC) 40 MG capsule    Allergies   Allergen Reactions    Hydrocodone Anaphylaxis     Tolerates percocet, dilaudid     Objective     Blood pressure 120/72, pulse 81, temperature 97.5 °F (36.4  "°C), temperature source Temporal, resp. rate 16, height 5' 8\" (1.727 m), weight 98.1 kg (216 lb 3.2 oz), SpO2 98%. Body mass index is 32.87 kg/m².    Physical Exam  Vitals and nursing note reviewed.   Constitutional:       General: He is not in acute distress.     Appearance: He is well-developed.   HENT:      Head: Normocephalic and atraumatic.   Eyes:      General: No scleral icterus.     Conjunctiva/sclera: Conjunctivae normal.   Cardiovascular:      Rate and Rhythm: Normal rate.   Pulmonary:      Effort: Pulmonary effort is normal. No respiratory distress.   Abdominal:      General: Bowel sounds are normal. There is no distension.      Palpations: Abdomen is soft.      Tenderness: There is no abdominal tenderness. There is no guarding or rebound.   Musculoskeletal:         General: No swelling.   Skin:     General: Skin is warm and dry.      Coloration: Skin is not jaundiced.   Neurological:      General: No focal deficit present.      Mental Status: He is alert and oriented to person, place, and time.   Psychiatric:         Mood and Affect: Mood normal.         Behavior: Behavior normal.       Lab Results:   No visits with results within 1 Day(s) from this visit.   Latest known visit with results is:   Office Visit on 12/22/2023   Component Date Value    SARS-CoV-2 12/22/2023 Positive (A)     INFLUENZA A PCR 12/22/2023 Negative     INFLUENZA B PCR 12/22/2023 Negative      Radiology Results:   No results found.    Alma Herrera PA-C    **Please note:  Dictation voice to text software may have been used in the creation of this record.  Occasional wrong word or “sound alike” substitutions may have occurred due to the inherent limitations of voice recognition software.  Read the chart carefully and recognize, using context, where substitutions have occurred.**  "

## 2024-02-26 ENCOUNTER — TELEPHONE (OUTPATIENT)
Age: 44
End: 2024-02-26

## 2024-03-04 ENCOUNTER — CONSULT (OUTPATIENT)
Dept: UROLOGY | Facility: CLINIC | Age: 44
End: 2024-03-04

## 2024-03-04 VITALS
SYSTOLIC BLOOD PRESSURE: 124 MMHG | BODY MASS INDEX: 33.15 KG/M2 | DIASTOLIC BLOOD PRESSURE: 80 MMHG | HEART RATE: 70 BPM | WEIGHT: 218 LBS

## 2024-03-04 DIAGNOSIS — R93.429 ABNORMAL CT SCAN, KIDNEY: Primary | ICD-10-CM

## 2024-03-04 PROCEDURE — 99203 OFFICE O/P NEW LOW 30 MIN: CPT

## 2024-03-04 NOTE — PROGRESS NOTES
3/4/2024    No chief complaint on file.      Assessment and Plan    43 y.o. male new patient to office    Abnormal CT of kidney  CT abdomen and pelvis with contrast 8/06/2023  Asymmetric CT nephrograms. Right kidney is in corticomedullary phase while the left is in the nephrographic phase. No hydroureteronephrosis. No renal or ureteral calculi identified.   This is a non-specific finding, no evidence of obstructive Uropathy. Prior Renal US from June 2023 showed normal size of kidneys, no hydronephrosis bilaterally.   Renal function within normal limits. Asymptomatic  He will be moving to [place] in 2 weeks, I suggested establishing care with a local Urologist to discuss consideration for follow-up CT Urogram versus continued surveillance  Follow-up as needed.       History of Present Illness  Flaquito Moore is a 43 y.o. male new patient to office. Here for evaluation of abnormal findings on CT imaging.    Patient presents today for evaluation of abnormal findings of the kidneys on CT imaging from August 2023 which showed Asymmetric CT nephrograms. Right kidney is in corticomedullary phase while the left is in the nephrographic phase. No hydroureteronephrosis. No renal or ureteral calculi identified.     He denies any history of kidney stones, no history of UTIs or pyelonephritis. He reports a strong family history of CKD in both his parents who both required dialysis. Neither of his parents are still living, his mother passed away in 201 and his father passed away in 2012. Both parents had history of diabetes and cardiac problems. His baseline Creatinine is between 1.0-1.3 with baseline eGFR of 68-80.           Review of Systems   Constitutional:  Negative for chills and fever.   HENT:  Negative for congestion and sore throat.    Respiratory:  Negative for cough and shortness of breath.    Cardiovascular:  Negative for chest pain and leg swelling.   Gastrointestinal:  Negative for abdominal pain, constipation  and diarrhea.   Genitourinary:  Negative for difficulty urinating, dysuria, frequency, hematuria and urgency.   Musculoskeletal:  Negative for back pain and gait problem.   Skin:  Negative for wound.   Allergic/Immunologic: Negative for immunocompromised state.   Hematological:  Does not bruise/bleed easily.           AUA SYMPTOM SCORE      Flowsheet Row Most Recent Value   AUA SYMPTOM SCORE    How often have you had a sensation of not emptying your bladder completely after you finished urinating? 0 (P)    How often have you had to urinate again less than two hours after you finished urinating? 3 (P)    How often have you found you stopped and started again several times when you urinate? 0 (P)    How often have you found it difficult to postpone urination? 1 (P)    How often have you had a weak urinary stream? 0 (P)    How often have you had to push or strain to begin urination? 0 (P)    How many times did you most typically get up to urinate from the time you went to bed at night until the time you got up in the morning? 0 (P)    Quality of Life: If you were to spend the rest of your life with your urinary condition just the way it is now, how would you feel about that? 0 (P)    AUA SYMPTOM SCORE 4 (P)             Vitals  Vitals:    03/04/24 0739   BP: 124/80   Pulse: 70   Weight: 98.9 kg (218 lb)       Physical Exam  Vitals reviewed.   Constitutional:       General: He is not in acute distress.     Appearance: Normal appearance. He is not ill-appearing or toxic-appearing.   HENT:      Head: Normocephalic and atraumatic.   Eyes:      General: No scleral icterus.     Conjunctiva/sclera: Conjunctivae normal.   Cardiovascular:      Rate and Rhythm: Normal rate.   Pulmonary:      Effort: Pulmonary effort is normal. No respiratory distress.   Abdominal:      Tenderness: There is no right CVA tenderness or left CVA tenderness.      Hernia: No hernia is present.   Musculoskeletal:      Cervical back: Normal range of  motion.      Right lower leg: No edema.      Left lower leg: No edema.   Skin:     General: Skin is warm and dry.      Coloration: Skin is not jaundiced or pale.   Neurological:      General: No focal deficit present.      Mental Status: He is alert and oriented to person, place, and time. Mental status is at baseline.      Gait: Gait normal.   Psychiatric:         Mood and Affect: Mood normal.         Behavior: Behavior normal.         Thought Content: Thought content normal.         Judgment: Judgment normal.       Past History  Past Medical History:   Diagnosis Date    Allergic     GERD (gastroesophageal reflux disease) 2008    Barretts esophagus    History of Magaña's esophagus     Hypoglycemia      Social History     Socioeconomic History    Marital status: /Civil Union     Spouse name: None    Number of children: None    Years of education: None    Highest education level: None   Occupational History    None   Tobacco Use    Smoking status: Every Day     Current packs/day: 1.50     Average packs/day: 1.5 packs/day for 24.0 years (36.0 ttl pk-yrs)     Types: Cigarettes, E-Cigarettes    Smokeless tobacco: Never   Vaping Use    Vaping status: Every Day    Substances: Nicotine   Substance and Sexual Activity    Alcohol use: Not Currently     Comment: quit drinking about 1.5    Drug use: Not Currently     Comment: former substanec use, sober 10 yrs    Sexual activity: Yes     Partners: Female     Birth control/protection: Female Sterilization   Other Topics Concern    None   Social History Narrative    None     Social Determinants of Health     Financial Resource Strain: Not on file   Food Insecurity: Not on file   Transportation Needs: Not on file   Physical Activity: Not on file   Stress: Not on file   Social Connections: Not on file   Intimate Partner Violence: Not on file   Housing Stability: Not on file     Social History     Tobacco Use   Smoking Status Every Day    Current packs/day: 1.50    Average  packs/day: 1.5 packs/day for 24.0 years (36.0 ttl pk-yrs)    Types: Cigarettes, E-Cigarettes   Smokeless Tobacco Never     Family History   Problem Relation Age of Onset    Heart disease Mother     Diabetes Mother     Kidney disease Mother     Heart disease Father     Diabetes Father     Kidney disease Father        The following portions of the patient's history were reviewed and updated as appropriate allergies, current medications, past medical history, past social history, past surgical history and problem list    Imagin/06/2023  CT ABDOMEN AND PELVIS WITH IV CONTRAST     INDICATION:   R10.11: Right upper quadrant pain.  Cholecystectomy.     COMPARISON: Ultrasound 2023. CT renal stone study 2021.     TECHNIQUE:  CT examination of the abdomen and pelvis was performed. Multiplanar 2D reformatted images were created from the source data.     This examination, like all CT scans performed in the [facility], was performed utilizing techniques to minimize radiation dose exposure, including the use of iterative reconstruction and automated exposure control. Radiation dose length   product (DLP) for this visit:  913 mGy-cm     IV Contrast:  100 mL of iohexol (OMNIPAQUE)     Enteric Contrast:  Enteric contrast was administered.     FINDINGS:     ABDOMEN     LOWER CHEST:  Small hiatal hernia .  No other clinically significant abnormality identified in the visualized lower chest.     LIVER/BILIARY TREE:  Unremarkable.     GALLBLADDER:  Gallbladder is surgically absent.     SPLEEN:  Unremarkable.     PANCREAS:  Unremarkable.     ADRENAL GLANDS:  Unremarkable.     KIDNEYS/URETERS: Asymmetric CT nephrograms. Right kidney is in corticomedullary phase while the left is in the nephrographic phase. No hydroureteronephrosis. No renal or ureteral calculi identified..     STOMACH AND BOWEL:  Unremarkable.     APPENDIX:  No findings to suggest appendicitis.     ABDOMINOPELVIC CAVITY:  No ascites.   "No pneumoperitoneum.  No lymphadenopathy.     VESSELS:  Unremarkable for patient's age. Bilateral renal arteries and veins appear patent.     PELVIS     REPRODUCTIVE ORGANS:  Unremarkable for patient's age.     URINARY BLADDER:  Unremarkable.     ABDOMINAL WALL/INGUINAL REGIONS:  Unremarkable.     OSSEOUS STRUCTURES:  No acute fracture or destructive osseous lesion. Degenerative changes.     IMPRESSION:     Asymmetric CT nephrograms. Right kidney is in corticomedullary phase while the left is in the nephrographic phase. No hydroureteronephrosis. No renal or ureteral calculi identified.     Recommend urology consultation.          Results  No results found for this or any previous visit (from the past 1 hour(s)).]  No results found for: \"PSA\"  Lab Results   Component Value Date    CALCIUM 9.3 06/17/2023    K 5.0 06/17/2023    CO2 29 06/17/2023     06/17/2023    BUN 18 06/17/2023    CREATININE 1.28 06/17/2023     Lab Results   Component Value Date    WBC 5.39 06/17/2023    HGB 15.2 06/17/2023    HCT 44.1 06/17/2023    MCV 86 06/17/2023     06/17/2023       Please Note:  Voice dictation software has been used to create this document. There may be inadvertent transcriptions errors.     MONI Horner 03/04/24   "

## 2024-04-01 ENCOUNTER — OFFICE VISIT (OUTPATIENT)
Dept: URGENT CARE | Facility: CLINIC | Age: 44
End: 2024-04-01
Payer: COMMERCIAL

## 2024-04-01 VITALS
OXYGEN SATURATION: 96 % | SYSTOLIC BLOOD PRESSURE: 125 MMHG | TEMPERATURE: 97.8 F | DIASTOLIC BLOOD PRESSURE: 76 MMHG | BODY MASS INDEX: 33.34 KG/M2 | WEIGHT: 220 LBS | HEIGHT: 68 IN | RESPIRATION RATE: 18 BRPM | HEART RATE: 86 BPM

## 2024-04-01 DIAGNOSIS — J20.9 ACUTE BRONCHITIS, UNSPECIFIED ORGANISM: Primary | ICD-10-CM

## 2024-04-01 PROCEDURE — G0382 LEV 3 HOSP TYPE B ED VISIT: HCPCS | Performed by: NURSE PRACTITIONER

## 2024-04-01 PROCEDURE — 99283 EMERGENCY DEPT VISIT LOW MDM: CPT | Performed by: NURSE PRACTITIONER

## 2024-04-01 RX ORDER — ALBUTEROL SULFATE 90 UG/1
2 AEROSOL, METERED RESPIRATORY (INHALATION) EVERY 4 HOURS PRN
Qty: 8.5 G | Refills: 1 | Status: SHIPPED | OUTPATIENT
Start: 2024-04-01

## 2024-04-01 RX ORDER — PREDNISONE 20 MG/1
40 TABLET ORAL DAILY
Qty: 10 TABLET | Refills: 0 | Status: SHIPPED | OUTPATIENT
Start: 2024-04-01 | End: 2024-04-06

## 2024-04-01 RX ORDER — AZITHROMYCIN 250 MG/1
TABLET, FILM COATED ORAL
Qty: 6 TABLET | Refills: 0 | Status: SHIPPED | OUTPATIENT
Start: 2024-04-01 | End: 2024-04-05

## 2024-04-01 NOTE — PATIENT INSTRUCTIONS
I have included information about POTS below.  This is typically diagnosed by a cardiologist.  I encourage you to establish with a cardiologist in [place].    Acute Bronchitis   AMBULATORY CARE:   Acute bronchitis  is swelling and irritation in your lungs. It is usually caused by a virus and most often happens in the winter. Bronchitis may also be caused by bacteria or by a chemical irritant, such as smoke.  Common symptoms:   Cough that lasts up to 3 weeks    Runny or stuffy nose    Hoarseness, sore throat    Fever    Feeling more tired than usual, and body aches    Wheezing or pain when you breathe or cough    Seek care immediately if:   You cough up blood.    Your lips or fingernails turn blue.    You feel like you are not getting enough air when you breathe.    Call your doctor if:   Your symptoms do not go away or get worse, even after treatment.    Your cough does not get better within 4 weeks.    You have questions or concerns about your condition or care.    Medicines:  You may need any of the following:  Cough suppressants  decrease your urge to cough.    Decongestants  help loosen mucus in your lungs and make it easier to cough up. This can help you breathe easier.    Inhalers  may be given. Your healthcare provider may give you one or more inhalers to help you breathe easier and cough less. An inhaler gives you medicine to open your airways. Ask your healthcare provider to show you how to use your inhaler correctly.         Antiviral medicine  treats infections caused by a virus.    Antibiotics  may be given if your bronchitis is caused by bacteria or if you have lung condition.    Acetaminophen  decreases pain and fever. It is available without a doctor's order. Ask how much to take and how often to take it. Follow directions. Read the labels of all other medicines you are using to see if they also contain acetaminophen, or ask your doctor or pharmacist. Acetaminophen can cause liver damage if not  taken correctly.    NSAIDs  help decrease swelling and pain or fever. This medicine is available with or without a doctor's order. NSAIDs can cause stomach bleeding or kidney problems in certain people. If you take blood thinner medicine, always ask your healthcare provider if NSAIDs are safe for you. Always read the medicine label and follow directions.    Self-care:   Drink liquids as directed.  You may need to drink more liquids than usual to stay hydrated. Ask how much liquid to drink each day and which liquids are best for you.    Use a cool mist humidifier.  This increases air moisture in your home. This may make it easier for you to breathe and help decrease your cough.     Get more rest.  Rest helps your body to heal. Slowly start to do more each day. Rest when you feel it is needed.    Prevent acute bronchitis:       Ask about vaccines you may need.  Get a flu vaccine each year as soon as recommended, usually in September or October. Ask your healthcare provider if you should also get a pneumonia or COVID-19 vaccine. Your healthcare provider can tell you if you should also get other vaccines, and when to get them.    Prevent the spread of germs.  You can decrease your risk for acute bronchitis and other illnesses by doing the following:    Wash your hands often with soap and water. Carry germ-killing hand lotion or gel with you. You can use the lotion or gel to clean your hands when soap and water are not available.         Do not touch your eyes, nose, or mouth unless you have washed your hands first.    Always cover your mouth when you cough to prevent the spread of germs. It is best to cough into a tissue or your shirt sleeve instead of into your hand. Ask those around you to cover their mouths when they cough.    Try to avoid people who have a cold or the flu. If you are sick, stay away from others as much as possible.    Avoid irritants in the air.  Avoid chemicals, fumes, and dust. Wear a face mask if  you must work around dust or fumes. Stay inside on days when air pollution levels are high. If you have allergies, stay inside when pollen counts are high. Do not use aerosol products, such as spray-on deodorant, bug spray, and hair spray.    Do not smoke or be around others who are smoking.  Nicotine and other chemicals in cigarettes and cigars can cause lung damage. Ask your healthcare provider for information if you currently smoke and need help to quit. E-cigarettes or smokeless tobacco still contain nicotine. Talk to your healthcare provider before you use these products.  Follow up with your doctor as directed:  Write down questions you have so you will remember to ask them during your follow-up visits.  © Copyright [employer] 2023 Information is for End User's use only and may not be sold, redistributed or otherwise used for commercial purposes.  The above information is an  only. It is not intended as medical advice for individual conditions or treatments. Talk to your doctor, nurse or pharmacist before following any medical regimen to see if it is safe and effective for you.  POTS (Postural Orthostatic Tachycardia Syndrome)   AMBULATORY CARE:   Postural orthostatic tachycardia syndrome (POTS)  is a term used to describe a fast heart rate that happens when you sit up or stand. Tachycardia is a heart rate of 100 beats per minute or more at rest. POTS may be caused by cardiovascular system problems, low blood volume, or blood pooling in your legs when you stand. A high level of certain hormones or health problems from another disease or condition can also cause POTS.  Other signs and symptoms that can happen  depend on the type of POTS you have:  Chest pain    Feeling lightheaded, dizzy, or faint    Trouble standing or being physically active for long periods    Headaches, neck pain, or blurred vision    Trouble concentrating    Fatigue or trouble sleeping    Swelling in your legs or sweating in  large amounts    Nausea, vomiting, or abdominal pain    Call your local emergency number (911 in the [place]) or have someone call if:   You have trouble breathing.    You collapse or go unconscious when you try to stand.    Seek care immediately if:   You cannot stand for 1 minute because your heart rate is very high, or you feel too lightheaded.    You have severe pain.    You have vomiting or diarrhea that is not stopping.    Call your doctor if:   You drink the recommended amount of liquid each day but are still having POTS symptoms.    Your heart rate is higher than usual when you change position.    Your brain fog or confusion is worse.    You have new muscle twitches.    You cannot drink liquids as fast as you are sweating them out.    Your lifestyle changes have made no change to your symptoms.    You have questions or concerns about your condition or care.    Treatment for POTS  depends on the cause. Your healthcare provider will treat any medical condition causing your symptoms. Your provider may make changes to your current medicines if a medicine is causing your POTS. Lifestyle changes are usually recommended first. If lifestyle changes do not work, certain medicines may be recommended. No medicine is approved to treat POTS, but some medicines can treat underlying causes or symptoms. The goal is to use medicines to control symptoms so you can start an exercise plan. Medicines may be given to improve your heart rate or to increase your blood volume. Medicines may be given to improve energy and strength, or to control your immune system. You may also need medicines to prevent or treat nausea or pain.  Manage POTS:  Your healthcare provider will help you create a specific plan to manage POTS. The plan may include these and other guidelines:  Know what to do if you feel your heart rate increase.  Remember to sit up or stand up slowly to prevent symptoms. If you still feel symptoms when you change position, sit or  lie back down right away. Your provider may also show you ways to bring your heart rate down. For example, you may be told to squeeze a rubber ball or to tighten certain muscles.    Check your heart rate (pulse) as directed.  Your provider will show you how to check your pulse, and how often to check it. Write down how fast your pulse is and what you were doing if your pulse is above 100. Bring the information with you to your follow-up appointments.         Exercise as directed.  Your provider or a physical therapist can show you how to exercise safely at home. Aerobic exercise, such as walking, can help strengthen your heart and lungs. Resistance training, such as lifting weights, can help build muscle strength. Your provider or therapist will tell you how much exercise to get each day, and which exercises are best for you. You might need to start with a few minutes of exercise at a time.    Avoid heat.  Heat can trigger your symptoms. Stay inside during very hot days, or limit the amount of time you are outside. Do not take hot baths or sit in a sauna.    Control other medical conditions that can cause or worsen POTS.  Your provider can help you create a management plan. You may also be referred to a specialist, such as a cardiologist.    Wear compression stockings as directed.  Compression stockings are tight around your lower legs. The pressure helps increase blood flow and reduce any swelling you may have in your legs.         Make changes to the foods you eat, if needed.  Have small, regular meals throughout the day. Your provider or a dietitian can help you create meal plans. You may also need to have more salt, depending on the cause of your POTS. Your provider or dietitian can help you get the right amount of salt each day. Other changes may be recommended if you have nausea, vomiting, or digestion problems.    Drink more liquid, as directed.  Liquid can help prevent or relieve some symptoms of POTS. For  example, liquid can prevent you from getting too hot and triggering symptoms. Your provider will tell you how much liquid you need, and how often to drink liquid. Your provider will tell you which liquids are best for you. Limit or do not drink alcohol. Too much alcohol may trigger or worsen symptoms of POTS.    Get more sleep, if needed.  Sleep can help you feel less tired during the day. Sleep with an extra pillow or a folded blanket under your pillow to keep your head and upper body raised. This may help prevent you from becoming dizzy or lightheaded when you stand up in the morning.    Follow up with your doctor as directed:  Write down your questions so you remember to ask them during your visits.  © Copyright [employer] 2023 Information is for End User's use only and may not be sold, redistributed or otherwise used for commercial purposes.  The above information is an  only. It is not intended as medical advice for individual conditions or treatments. Talk to your doctor, nurse or pharmacist before following any medical regimen to see if it is safe and effective for you.

## 2024-04-01 NOTE — PROGRESS NOTES
[facility]        NAME: lFaquito Moore is a 43 y.o. male  : 1980    MRN: [id number]  DATE: 2024  TIME: 11:07 AM      Assessment and Plan     Acute bronchitis, unspecified organism [J20.9]  1. Acute bronchitis, unspecified organism  azithromycin (ZITHROMAX) 250 mg tablet    predniSONE 20 mg tablet    albuterol (ProAir HFA) 90 mcg/act inhaler            Patient Instructions     Patient Instructions   I have included information about POTS below.  This is typically diagnosed by a cardiologist.  I encourage you to establish with a cardiologist in [place].    Acute Bronchitis   AMBULATORY CARE:   Acute bronchitis  is swelling and irritation in your lungs. It is usually caused by a virus and most often happens in the winter. Bronchitis may also be caused by bacteria or by a chemical irritant, such as smoke.  Common symptoms:   Cough that lasts up to 3 weeks    Runny or stuffy nose    Hoarseness, sore throat    Fever    Feeling more tired than usual, and body aches    Wheezing or pain when you breathe or cough    Seek care immediately if:   You cough up blood.    Your lips or fingernails turn blue.    You feel like you are not getting enough air when you breathe.    Call your doctor if:   Your symptoms do not go away or get worse, even after treatment.    Your cough does not get better within 4 weeks.    You have questions or concerns about your condition or care.    Medicines:  You may need any of the following:  Cough suppressants  decrease your urge to cough.    Decongestants  help loosen mucus in your lungs and make it easier to cough up. This can help you breathe easier.    Inhalers  may be given. Your healthcare provider may give you one or more inhalers to help you breathe easier and cough less. An inhaler gives you medicine to open your airways. Ask your healthcare provider to show you how to use your inhaler correctly.         Antiviral medicine  treats infections caused by a  virus.    Antibiotics  may be given if your bronchitis is caused by bacteria or if you have lung condition.    Acetaminophen  decreases pain and fever. It is available without a doctor's order. Ask how much to take and how often to take it. Follow directions. Read the labels of all other medicines you are using to see if they also contain acetaminophen, or ask your doctor or pharmacist. Acetaminophen can cause liver damage if not taken correctly.    NSAIDs  help decrease swelling and pain or fever. This medicine is available with or without a doctor's order. NSAIDs can cause stomach bleeding or kidney problems in certain people. If you take blood thinner medicine, always ask your healthcare provider if NSAIDs are safe for you. Always read the medicine label and follow directions.    Self-care:   Drink liquids as directed.  You may need to drink more liquids than usual to stay hydrated. Ask how much liquid to drink each day and which liquids are best for you.    Use a cool mist humidifier.  This increases air moisture in your home. This may make it easier for you to breathe and help decrease your cough.     Get more rest.  Rest helps your body to heal. Slowly start to do more each day. Rest when you feel it is needed.    Prevent acute bronchitis:       Ask about vaccines you may need.  Get a flu vaccine each year as soon as recommended, usually in September or October. Ask your healthcare provider if you should also get a pneumonia or COVID-19 vaccine. Your healthcare provider can tell you if you should also get other vaccines, and when to get them.    Prevent the spread of germs.  You can decrease your risk for acute bronchitis and other illnesses by doing the following:    Wash your hands often with soap and water. Carry germ-killing hand lotion or gel with you. You can use the lotion or gel to clean your hands when soap and water are not available.         Do not touch your eyes, nose, or mouth unless you have  washed your hands first.    Always cover your mouth when you cough to prevent the spread of germs. It is best to cough into a tissue or your shirt sleeve instead of into your hand. Ask those around you to cover their mouths when they cough.    Try to avoid people who have a cold or the flu. If you are sick, stay away from others as much as possible.    Avoid irritants in the air.  Avoid chemicals, fumes, and dust. Wear a face mask if you must work around dust or fumes. Stay inside on days when air pollution levels are high. If you have allergies, stay inside when pollen counts are high. Do not use aerosol products, such as spray-on deodorant, bug spray, and hair spray.    Do not smoke or be around others who are smoking.  Nicotine and other chemicals in cigarettes and cigars can cause lung damage. Ask your healthcare provider for information if you currently smoke and need help to quit. E-cigarettes or smokeless tobacco still contain nicotine. Talk to your healthcare provider before you use these products.  Follow up with your doctor as directed:  Write down questions you have so you will remember to ask them during your follow-up visits.  © Copyright [employer] 2023 Information is for End User's use only and may not be sold, redistributed or otherwise used for commercial purposes.  The above information is an  only. It is not intended as medical advice for individual conditions or treatments. Talk to your doctor, nurse or pharmacist before following any medical regimen to see if it is safe and effective for you.  POTS (Postural Orthostatic Tachycardia Syndrome)   AMBULATORY CARE:   Postural orthostatic tachycardia syndrome (POTS)  is a term used to describe a fast heart rate that happens when you sit up or stand. Tachycardia is a heart rate of 100 beats per minute or more at rest. POTS may be caused by cardiovascular system problems, low blood volume, or blood pooling in your legs when you stand. A  high level of certain hormones or health problems from another disease or condition can also cause POTS.  Other signs and symptoms that can happen  depend on the type of POTS you have:  Chest pain    Feeling lightheaded, dizzy, or faint    Trouble standing or being physically active for long periods    Headaches, neck pain, or blurred vision    Trouble concentrating    Fatigue or trouble sleeping    Swelling in your legs or sweating in large amounts    Nausea, vomiting, or abdominal pain    Call your local emergency number (911 in the [place]) or have someone call if:   You have trouble breathing.    You collapse or go unconscious when you try to stand.    Seek care immediately if:   You cannot stand for 1 minute because your heart rate is very high, or you feel too lightheaded.    You have severe pain.    You have vomiting or diarrhea that is not stopping.    Call your doctor if:   You drink the recommended amount of liquid each day but are still having POTS symptoms.    Your heart rate is higher than usual when you change position.    Your brain fog or confusion is worse.    You have new muscle twitches.    You cannot drink liquids as fast as you are sweating them out.    Your lifestyle changes have made no change to your symptoms.    You have questions or concerns about your condition or care.    Treatment for POTS  depends on the cause. Your healthcare provider will treat any medical condition causing your symptoms. Your provider may make changes to your current medicines if a medicine is causing your POTS. Lifestyle changes are usually recommended first. If lifestyle changes do not work, certain medicines may be recommended. No medicine is approved to treat POTS, but some medicines can treat underlying causes or symptoms. The goal is to use medicines to control symptoms so you can start an exercise plan. Medicines may be given to improve your heart rate or to increase your blood volume. Medicines may be given to  improve energy and strength, or to control your immune system. You may also need medicines to prevent or treat nausea or pain.  Manage POTS:  Your healthcare provider will help you create a specific plan to manage POTS. The plan may include these and other guidelines:  Know what to do if you feel your heart rate increase.  Remember to sit up or stand up slowly to prevent symptoms. If you still feel symptoms when you change position, sit or lie back down right away. Your provider may also show you ways to bring your heart rate down. For example, you may be told to squeeze a rubber ball or to tighten certain muscles.    Check your heart rate (pulse) as directed.  Your provider will show you how to check your pulse, and how often to check it. Write down how fast your pulse is and what you were doing if your pulse is above 100. Bring the information with you to your follow-up appointments.         Exercise as directed.  Your provider or a physical therapist can show you how to exercise safely at home. Aerobic exercise, such as walking, can help strengthen your heart and lungs. Resistance training, such as lifting weights, can help build muscle strength. Your provider or therapist will tell you how much exercise to get each day, and which exercises are best for you. You might need to start with a few minutes of exercise at a time.    Avoid heat.  Heat can trigger your symptoms. Stay inside during very hot days, or limit the amount of time you are outside. Do not take hot baths or sit in a sauna.    Control other medical conditions that can cause or worsen POTS.  Your provider can help you create a management plan. You may also be referred to a specialist, such as a cardiologist.    Wear compression stockings as directed.  Compression stockings are tight around your lower legs. The pressure helps increase blood flow and reduce any swelling you may have in your legs.         Make changes to the foods you eat, if needed.   Have small, regular meals throughout the day. Your provider or a dietitian can help you create meal plans. You may also need to have more salt, depending on the cause of your POTS. Your provider or dietitian can help you get the right amount of salt each day. Other changes may be recommended if you have nausea, vomiting, or digestion problems.    Drink more liquid, as directed.  Liquid can help prevent or relieve some symptoms of POTS. For example, liquid can prevent you from getting too hot and triggering symptoms. Your provider will tell you how much liquid you need, and how often to drink liquid. Your provider will tell you which liquids are best for you. Limit or do not drink alcohol. Too much alcohol may trigger or worsen symptoms of POTS.    Get more sleep, if needed.  Sleep can help you feel less tired during the day. Sleep with an extra pillow or a folded blanket under your pillow to keep your head and upper body raised. This may help prevent you from becoming dizzy or lightheaded when you stand up in the morning.    Follow up with your doctor as directed:  Write down your questions so you remember to ask them during your visits.  © Copyright [employer] 2023 Information is for End User's use only and may not be sold, redistributed or otherwise used for commercial purposes.  The above information is an  only. It is not intended as medical advice for individual conditions or treatments. Talk to your doctor, nurse or pharmacist before following any medical regimen to see if it is safe and effective for you.        Follow up with PCP in 3-5 days.  Proceed to  ER if symptoms worsen.    Chief Complaint     Chief Complaint   Patient presents with    Cough     Pt c/o mold exposure now causing a cough and SOB, lungs feel like they are on fire. Started yesterday afternoon.          History of Present Illness     Former smoker, quit 6 years ago; current vaper but has been cutting back.  Notes 3 years of  basement water damage, unresolved yet--landlord aware (residence was sold recently).  Patient notes that he is moving to [place] this Wednesday.  Hx bronchitis.  Onset of chest symptoms yesterday while vaping down in basement.  Has prn inhaler--needs a refill since about a year old.  Tried using it 2x yesterday--helped a little bit.    Notes covid in Dec 2023 and ever since then will have HR increases when he first stands up.  No lightheadedness, etc but it does increase his anxiety when it happens.  Wonders about POTS.    Cough  Associated symptoms include shortness of breath and wheezing.       Review of Systems     Review of Systems   Respiratory:  Positive for cough, chest tightness, shortness of breath and wheezing.    All other systems reviewed and are negative.        Current Medications       Current Outpatient Medications:     albuterol (ProAir HFA) 90 mcg/act inhaler, Inhale 2 puffs every 4 (four) hours as needed for wheezing or shortness of breath, Disp: 8.5 g, Rfl: 1    azithromycin (ZITHROMAX) 250 mg tablet, Take 2 tablets today then 1 tablet daily x 4 days, Disp: 6 tablet, Rfl: 0    omeprazole (PriLOSEC) 40 MG capsule, Take 1 capsule (40 mg total) by mouth 2 (two) times a day Take 1 PO BID., Disp: 60 capsule, Rfl: 5    predniSONE 20 mg tablet, Take 2 tablets (40 mg total) by mouth daily for 5 days, Disp: 10 tablet, Rfl: 0    naproxen (Naprosyn) 500 mg tablet, Take 1 tablet (500 mg total) by mouth 2 (two) times a day with meals (Patient not taking: Reported on 4/1/2024), Disp: 30 tablet, Rfl: 0    Current Allergies     Allergies as of 04/01/2024 - Reviewed 04/01/2024   Allergen Reaction Noted    Hydrocodone Anaphylaxis 11/23/2018              The following portions of the patient's history were reviewed and updated as appropriate: allergies, current medications, past family history, past medical history, past social history, past surgical history and problem list.     Past Medical History:  "  Diagnosis Date    Allergic     GERD (gastroesophageal reflux disease) 2008    Barretts esophagus    History of Magaña's esophagus     Hypoglycemia        Past Surgical History:   Procedure Laterality Date    CHOLECYSTECTOMY  2014    EGD      WRIST SURGERY  1987    bone cyst       Family History   Problem Relation Age of Onset    Heart disease Mother     Diabetes Mother     Kidney disease Mother     Heart disease Father     Diabetes Father     Kidney disease Father          Medications have been verified.        Objective     /76   Pulse 86   Temp 97.8 °F (36.6 °C) (Temporal)   Resp 18   Ht 5' 8\" (1.727 m)   Wt 99.8 kg (220 lb)   SpO2 96%   BMI 33.45 kg/m²   No LMP for male patient.         Physical Exam     Physical Exam  Vitals and nursing note reviewed.   Constitutional:       General: He is not in acute distress.     Appearance: Normal appearance. He is well-developed and well-groomed. He is ill-appearing (mild). He is not toxic-appearing or diaphoretic.   HENT:      Head: Normocephalic and atraumatic.      Nose: Congestion (slight) present.   Eyes:      Pupils: Pupils are equal, round, and reactive to light.   Cardiovascular:      Rate and Rhythm: Normal rate and regular rhythm. No extrasystoles are present.     Heart sounds: Normal heart sounds, S1 normal and S2 normal. No murmur heard.     No friction rub. No gallop.   Pulmonary:      Effort: Pulmonary effort is normal. No tachypnea, bradypnea, accessory muscle usage, prolonged expiration, respiratory distress or retractions.      Breath sounds: Normal air entry. No stridor or decreased air movement. Wheezing (mild I&E wheezes throughout; good air movement) present. No decreased breath sounds, rhonchi or rales.   Chest:      Chest wall: No tenderness.   Abdominal:      General: Bowel sounds are normal. There is no distension.      Palpations: Abdomen is soft.      Tenderness: There is no abdominal tenderness.   Musculoskeletal:         General: " Normal range of motion.      Cervical back: Normal range of motion and neck supple.   Skin:     General: Skin is warm and dry.      Capillary Refill: Capillary refill takes less than 2 seconds.   Neurological:      General: No focal deficit present.      Mental Status: He is alert and oriented to person, place, and time.   Psychiatric:         Mood and Affect: Mood normal.         Behavior: Behavior normal. Behavior is cooperative.         Thought Content: Thought content normal.         Judgment: Judgment normal.